# Patient Record
Sex: FEMALE | Race: WHITE | NOT HISPANIC OR LATINO | Employment: FULL TIME
[De-identification: names, ages, dates, MRNs, and addresses within clinical notes are randomized per-mention and may not be internally consistent; named-entity substitution may affect disease eponyms.]

---

## 2022-02-19 ENCOUNTER — HEALTH MAINTENANCE LETTER (OUTPATIENT)
Age: 27
End: 2022-02-19

## 2022-03-04 ENCOUNTER — EXTERNAL ORDER RESULTS (OUTPATIENT)
Dept: LAB | Facility: CLINIC | Age: 27
End: 2022-03-04
Payer: COMMERCIAL

## 2022-04-28 ENCOUNTER — TRANSFERRED RECORDS (OUTPATIENT)
Dept: HEALTH INFORMATION MANAGEMENT | Facility: CLINIC | Age: 27
End: 2022-04-28
Payer: COMMERCIAL

## 2022-05-19 ENCOUNTER — HOSPITAL ENCOUNTER (OUTPATIENT)
Age: 27
End: 2022-05-19
Payer: COMMERCIAL

## 2022-05-29 ENCOUNTER — HOSPITAL ENCOUNTER (INPATIENT)
Facility: CLINIC | Age: 27
LOS: 3 days | Discharge: HOME HEALTH | End: 2022-06-01
Attending: OBSTETRICS & GYNECOLOGY | Admitting: OBSTETRICS & GYNECOLOGY
Payer: COMMERCIAL

## 2022-05-29 LAB
ABO/RH(D): NORMAL
ANTIBODY SCREEN: NEGATIVE
HGB BLD-MCNC: 12.5 G/DL (ref 11.7–15.7)
HOLD SPECIMEN: NORMAL
RUPTURE OF FETAL MEMBRANES BY ROM PLUS: POSITIVE
SARS-COV-2 RNA RESP QL NAA+PROBE: NEGATIVE
SPECIMEN EXPIRATION DATE: NORMAL

## 2022-05-29 PROCEDURE — 59025 FETAL NON-STRESS TEST: CPT

## 2022-05-29 PROCEDURE — U0003 INFECTIOUS AGENT DETECTION BY NUCLEIC ACID (DNA OR RNA); SEVERE ACUTE RESPIRATORY SYNDROME CORONAVIRUS 2 (SARS-COV-2) (CORONAVIRUS DISEASE [COVID-19]), AMPLIFIED PROBE TECHNIQUE, MAKING USE OF HIGH THROUGHPUT TECHNOLOGIES AS DESCRIBED BY CMS-2020-01-R: HCPCS | Performed by: OBSTETRICS & GYNECOLOGY

## 2022-05-29 PROCEDURE — C9803 HOPD COVID-19 SPEC COLLECT: HCPCS

## 2022-05-29 PROCEDURE — 120N000001 HC R&B MED SURG/OB

## 2022-05-29 PROCEDURE — 86780 TREPONEMA PALLIDUM: CPT | Performed by: OBSTETRICS & GYNECOLOGY

## 2022-05-29 PROCEDURE — 36415 COLL VENOUS BLD VENIPUNCTURE: CPT | Performed by: OBSTETRICS & GYNECOLOGY

## 2022-05-29 PROCEDURE — 250N000009 HC RX 250: Performed by: OBSTETRICS & GYNECOLOGY

## 2022-05-29 PROCEDURE — 86901 BLOOD TYPING SEROLOGIC RH(D): CPT | Performed by: OBSTETRICS & GYNECOLOGY

## 2022-05-29 PROCEDURE — 250N000013 HC RX MED GY IP 250 OP 250 PS 637: Performed by: OBSTETRICS & GYNECOLOGY

## 2022-05-29 PROCEDURE — 258N000003 HC RX IP 258 OP 636: Performed by: OBSTETRICS & GYNECOLOGY

## 2022-05-29 PROCEDURE — 85018 HEMOGLOBIN: CPT | Performed by: OBSTETRICS & GYNECOLOGY

## 2022-05-29 PROCEDURE — G0463 HOSPITAL OUTPT CLINIC VISIT: HCPCS | Mod: 25

## 2022-05-29 PROCEDURE — 84112 EVAL AMNIOTIC FLUID PROTEIN: CPT | Performed by: OBSTETRICS & GYNECOLOGY

## 2022-05-29 RX ORDER — PROCHLORPERAZINE MALEATE 5 MG
10 TABLET ORAL EVERY 6 HOURS PRN
Status: DISCONTINUED | OUTPATIENT
Start: 2022-05-29 | End: 2022-05-30 | Stop reason: HOSPADM

## 2022-05-29 RX ORDER — OXYTOCIN 10 [USP'U]/ML
10 INJECTION, SOLUTION INTRAMUSCULAR; INTRAVENOUS
Status: DISCONTINUED | OUTPATIENT
Start: 2022-05-29 | End: 2022-05-30

## 2022-05-29 RX ORDER — TRANEXAMIC ACID 10 MG/ML
1 INJECTION, SOLUTION INTRAVENOUS EVERY 30 MIN PRN
Status: DISCONTINUED | OUTPATIENT
Start: 2022-05-29 | End: 2022-05-30 | Stop reason: HOSPADM

## 2022-05-29 RX ORDER — PROCHLORPERAZINE 25 MG
25 SUPPOSITORY, RECTAL RECTAL EVERY 12 HOURS PRN
Status: DISCONTINUED | OUTPATIENT
Start: 2022-05-29 | End: 2022-05-29 | Stop reason: HOSPADM

## 2022-05-29 RX ORDER — ONDANSETRON 4 MG/1
4 TABLET, ORALLY DISINTEGRATING ORAL EVERY 6 HOURS PRN
Status: DISCONTINUED | OUTPATIENT
Start: 2022-05-29 | End: 2022-05-29 | Stop reason: HOSPADM

## 2022-05-29 RX ORDER — METOCLOPRAMIDE 10 MG/1
10 TABLET ORAL EVERY 6 HOURS PRN
Status: DISCONTINUED | OUTPATIENT
Start: 2022-05-29 | End: 2022-05-30 | Stop reason: HOSPADM

## 2022-05-29 RX ORDER — NALOXONE HYDROCHLORIDE 0.4 MG/ML
0.4 INJECTION, SOLUTION INTRAMUSCULAR; INTRAVENOUS; SUBCUTANEOUS
Status: DISCONTINUED | OUTPATIENT
Start: 2022-05-29 | End: 2022-05-30 | Stop reason: HOSPADM

## 2022-05-29 RX ORDER — NALOXONE HYDROCHLORIDE 0.4 MG/ML
0.2 INJECTION, SOLUTION INTRAMUSCULAR; INTRAVENOUS; SUBCUTANEOUS
Status: DISCONTINUED | OUTPATIENT
Start: 2022-05-29 | End: 2022-05-30 | Stop reason: HOSPADM

## 2022-05-29 RX ORDER — CITRIC ACID/SODIUM CITRATE 334-500MG
30 SOLUTION, ORAL ORAL
Status: DISCONTINUED | OUTPATIENT
Start: 2022-05-29 | End: 2022-05-30 | Stop reason: HOSPADM

## 2022-05-29 RX ORDER — IBUPROFEN 400 MG/1
800 TABLET, FILM COATED ORAL
Status: DISCONTINUED | OUTPATIENT
Start: 2022-05-29 | End: 2022-05-30

## 2022-05-29 RX ORDER — ONDANSETRON 2 MG/ML
4 INJECTION INTRAMUSCULAR; INTRAVENOUS EVERY 6 HOURS PRN
Status: DISCONTINUED | OUTPATIENT
Start: 2022-05-29 | End: 2022-05-29 | Stop reason: HOSPADM

## 2022-05-29 RX ORDER — METOCLOPRAMIDE HYDROCHLORIDE 5 MG/ML
10 INJECTION INTRAMUSCULAR; INTRAVENOUS EVERY 6 HOURS PRN
Status: DISCONTINUED | OUTPATIENT
Start: 2022-05-29 | End: 2022-05-30 | Stop reason: HOSPADM

## 2022-05-29 RX ORDER — CARBOPROST TROMETHAMINE 250 UG/ML
250 INJECTION, SOLUTION INTRAMUSCULAR
Status: DISCONTINUED | OUTPATIENT
Start: 2022-05-29 | End: 2022-05-30 | Stop reason: HOSPADM

## 2022-05-29 RX ORDER — KETOROLAC TROMETHAMINE 30 MG/ML
30 INJECTION, SOLUTION INTRAMUSCULAR; INTRAVENOUS
Status: DISCONTINUED | OUTPATIENT
Start: 2022-05-29 | End: 2022-05-30

## 2022-05-29 RX ORDER — PROCHLORPERAZINE 25 MG
25 SUPPOSITORY, RECTAL RECTAL EVERY 12 HOURS PRN
Status: DISCONTINUED | OUTPATIENT
Start: 2022-05-29 | End: 2022-05-30 | Stop reason: HOSPADM

## 2022-05-29 RX ORDER — OXYTOCIN 10 [USP'U]/ML
10 INJECTION, SOLUTION INTRAMUSCULAR; INTRAVENOUS
Status: DISCONTINUED | OUTPATIENT
Start: 2022-05-29 | End: 2022-05-30 | Stop reason: HOSPADM

## 2022-05-29 RX ORDER — LIDOCAINE 40 MG/G
CREAM TOPICAL
Status: DISCONTINUED | OUTPATIENT
Start: 2022-05-29 | End: 2022-05-30 | Stop reason: HOSPADM

## 2022-05-29 RX ORDER — METOCLOPRAMIDE HYDROCHLORIDE 5 MG/ML
10 INJECTION INTRAMUSCULAR; INTRAVENOUS EVERY 6 HOURS PRN
Status: DISCONTINUED | OUTPATIENT
Start: 2022-05-29 | End: 2022-05-29 | Stop reason: HOSPADM

## 2022-05-29 RX ORDER — PROCHLORPERAZINE MALEATE 5 MG
10 TABLET ORAL EVERY 6 HOURS PRN
Status: DISCONTINUED | OUTPATIENT
Start: 2022-05-29 | End: 2022-05-29 | Stop reason: HOSPADM

## 2022-05-29 RX ORDER — MISOPROSTOL 200 UG/1
400 TABLET ORAL
Status: DISCONTINUED | OUTPATIENT
Start: 2022-05-29 | End: 2022-05-30 | Stop reason: HOSPADM

## 2022-05-29 RX ORDER — SODIUM CHLORIDE, SODIUM LACTATE, POTASSIUM CHLORIDE, CALCIUM CHLORIDE 600; 310; 30; 20 MG/100ML; MG/100ML; MG/100ML; MG/100ML
INJECTION, SOLUTION INTRAVENOUS CONTINUOUS PRN
Status: DISCONTINUED | OUTPATIENT
Start: 2022-05-29 | End: 2022-05-30 | Stop reason: HOSPADM

## 2022-05-29 RX ORDER — ONDANSETRON 4 MG/1
4 TABLET, ORALLY DISINTEGRATING ORAL EVERY 6 HOURS PRN
Status: DISCONTINUED | OUTPATIENT
Start: 2022-05-29 | End: 2022-05-30 | Stop reason: HOSPADM

## 2022-05-29 RX ORDER — OXYTOCIN/0.9 % SODIUM CHLORIDE 30/500 ML
100-340 PLASTIC BAG, INJECTION (ML) INTRAVENOUS CONTINUOUS PRN
Status: DISCONTINUED | OUTPATIENT
Start: 2022-05-29 | End: 2022-05-30

## 2022-05-29 RX ORDER — METOCLOPRAMIDE 10 MG/1
10 TABLET ORAL EVERY 6 HOURS PRN
Status: DISCONTINUED | OUTPATIENT
Start: 2022-05-29 | End: 2022-05-29 | Stop reason: HOSPADM

## 2022-05-29 RX ORDER — TERBUTALINE SULFATE 1 MG/ML
0.25 INJECTION, SOLUTION SUBCUTANEOUS
Status: DISCONTINUED | OUTPATIENT
Start: 2022-05-29 | End: 2022-05-30 | Stop reason: HOSPADM

## 2022-05-29 RX ORDER — METHYLERGONOVINE MALEATE 0.2 MG/ML
200 INJECTION INTRAVENOUS
Status: DISCONTINUED | OUTPATIENT
Start: 2022-05-29 | End: 2022-05-30 | Stop reason: HOSPADM

## 2022-05-29 RX ORDER — SODIUM CHLORIDE, SODIUM LACTATE, POTASSIUM CHLORIDE, CALCIUM CHLORIDE 600; 310; 30; 20 MG/100ML; MG/100ML; MG/100ML; MG/100ML
INJECTION, SOLUTION INTRAVENOUS CONTINUOUS
Status: DISCONTINUED | OUTPATIENT
Start: 2022-05-29 | End: 2022-05-30 | Stop reason: HOSPADM

## 2022-05-29 RX ORDER — MISOPROSTOL 100 UG/1
25 TABLET ORAL
Status: DISCONTINUED | OUTPATIENT
Start: 2022-05-29 | End: 2022-05-30 | Stop reason: HOSPADM

## 2022-05-29 RX ORDER — OXYTOCIN/0.9 % SODIUM CHLORIDE 30/500 ML
1-24 PLASTIC BAG, INJECTION (ML) INTRAVENOUS CONTINUOUS
Status: DISCONTINUED | OUTPATIENT
Start: 2022-05-29 | End: 2022-05-30 | Stop reason: HOSPADM

## 2022-05-29 RX ORDER — ONDANSETRON 2 MG/ML
4 INJECTION INTRAMUSCULAR; INTRAVENOUS EVERY 6 HOURS PRN
Status: DISCONTINUED | OUTPATIENT
Start: 2022-05-29 | End: 2022-05-30 | Stop reason: HOSPADM

## 2022-05-29 RX ORDER — MISOPROSTOL 200 UG/1
800 TABLET ORAL
Status: DISCONTINUED | OUTPATIENT
Start: 2022-05-29 | End: 2022-05-30 | Stop reason: HOSPADM

## 2022-05-29 RX ORDER — VITAMIN A ACETATE, .BETA.-CAROTENE, ASCORBIC ACID, CHOLECALCIFEROL, .ALPHA.-TOCOPHEROL ACETATE, DL-, THIAMINE MONONITRATE, RIBOFLAVIN, NIACINAMIDE, PYRIDOXINE HYDROCHLORIDE, FOLIC ACID, CYANOCOBALAMIN, CALCIUM CARBONATE, FERROUS FUMARATE, ZINC OXIDE, AND CUPRIC OXIDE 2000; 2000; 120; 400; 22; 1.84; 3; 20; 10; 1; 12; 200; 27; 25; 2 [IU]/1; [IU]/1; MG/1; [IU]/1; MG/1; MG/1; MG/1; MG/1; MG/1; MG/1; UG/1; MG/1; MG/1; MG/1; MG/1
1 TABLET ORAL DAILY
COMMUNITY

## 2022-05-29 RX ORDER — OXYTOCIN/0.9 % SODIUM CHLORIDE 30/500 ML
340 PLASTIC BAG, INJECTION (ML) INTRAVENOUS CONTINUOUS PRN
Status: DISCONTINUED | OUTPATIENT
Start: 2022-05-29 | End: 2022-05-30 | Stop reason: HOSPADM

## 2022-05-29 RX ADMIN — Medication 2 MILLI-UNITS/MIN: at 20:15

## 2022-05-29 RX ADMIN — MISOPROSTOL 25 MCG: 100 TABLET ORAL at 14:05

## 2022-05-29 RX ADMIN — SODIUM CHLORIDE, POTASSIUM CHLORIDE, SODIUM LACTATE AND CALCIUM CHLORIDE: 600; 310; 30; 20 INJECTION, SOLUTION INTRAVENOUS at 20:11

## 2022-05-29 RX ADMIN — MISOPROSTOL 25 MCG: 100 TABLET ORAL at 16:05

## 2022-05-29 RX ADMIN — MISOPROSTOL 25 MCG: 100 TABLET ORAL at 12:06

## 2022-05-29 RX ADMIN — MISOPROSTOL 25 MCG: 100 TABLET ORAL at 18:00

## 2022-05-29 ASSESSMENT — ACTIVITIES OF DAILY LIVING (ADL)
ADLS_ACUITY_SCORE: 18

## 2022-05-29 NOTE — PLAN OF CARE
Pt and  oriented to room and surroundings.  EUS/TOCO applied. VSS. IV saline lock inserted. COVID swab collected and sent to lab. PO cytotec started. Pt feels slight occasional cramping.

## 2022-05-29 NOTE — PROGRESS NOTES
"OB Progress Note  2022  6:38 PM    S:  Pt doing well. Strong menstrual cramping.     O:  /70   Temp 97.88  F (36.6  C) (Temporal)   Resp 16   Ht 1.626 m (5' 4\")   Wt 72.1 kg (159 lb)   BMI 27.29 kg/m    EFM: baseline 145 bpm, moderate variability, accelerations present, no decels  [place]:  Ctx occasional  SVE:  /-2 on admit  Membranes: SROM @ 0000    A/P: 27 year old  @ 40w2d admitted for labor management after PROM.     Labor:  - s/p SROM, clear fluid  - s/p 4 doses of cytotec  - Plan to check when next due and likely start IV pitocin  - Pain: per patient preference     FWB: Category I, reactive  - continuous fetal monitoring     Prenatal: GBS negative  - s/p Tdap, influenza, COVID vaccines  - COVID screen negative     Teresa Garza MD  6:41 PM    "

## 2022-05-29 NOTE — PLAN OF CARE
Pt arrives stating she's been leaking clear fluid since midnight. She denies vaginal bleeding or ctx and states her baby has been active. Verbal consent received to place external monitors, perform sve and rom plus. Admission intake done. Prenatal reviewed. Pt's  supportive at bedside.

## 2022-05-29 NOTE — H&P
"OB Brief Admit H&P    No significant change in general health status based on examination of the patient, review of Nursing Admission Database and prenatal record.    Pt is a 27 year old  @ 40w2d who presented to L&D with leaking of fluid. Noted small leaking at midnight.    Patient's prenatal course has been complicated by failed 1 hr GCT, passed 3hr GTT, marginal cord insertion with normal growth, possible bilobed placenta.     Prenatal Labs:    Blood type A pos  Rubella immune  GCT failed, passed 3 hr GTT  GBS negative    EFW: 7.5 lbs    /67   Temp 99.1  F (37.3  C) (Temporal)   Resp 16   Ht 1.626 m (5' 4\")   Wt 72.1 kg (159 lb)   BMI 27.29 kg/m    EFM:  Baseline 145 bpm, moderate variability, accels present, no decels  [place]: rare contractions  SVE: 50/-2 per RN  Membranes:  SROM 0000     Assessment:  27 year old  @ 40w2d admitted for labor management after PROM.    Labor:  - s/p SROM, clear fluid  - Will starting with a few doses of PO cytotec for cervical ripening  - Pit once more favorable    FWB: Category I, reactive  - continuous fetal monitoring    Prenatal: GBS negative  - s/p Tdap, influenza, COVID vaccines  - COVID screen negative    Teresa Garza MD  2022  12:58 PM      "

## 2022-05-29 NOTE — PROVIDER NOTIFICATION
Dr Garza notified via phone of pt's positive rom plus result, sve, fht. New orders received for admission. Pt moved to labor room. Report given to NIK Silverman RN.

## 2022-05-30 ENCOUNTER — ANESTHESIA EVENT (OUTPATIENT)
Dept: OBGYN | Facility: CLINIC | Age: 27
End: 2022-05-30
Payer: COMMERCIAL

## 2022-05-30 ENCOUNTER — ANESTHESIA (OUTPATIENT)
Dept: OBGYN | Facility: CLINIC | Age: 27
End: 2022-05-30
Payer: COMMERCIAL

## 2022-05-30 PROCEDURE — 0HQ9XZZ REPAIR PERINEUM SKIN, EXTERNAL APPROACH: ICD-10-PCS | Performed by: MIDWIFE

## 2022-05-30 PROCEDURE — 258N000003 HC RX IP 258 OP 636: Performed by: OBSTETRICS & GYNECOLOGY

## 2022-05-30 PROCEDURE — 250N000011 HC RX IP 250 OP 636

## 2022-05-30 PROCEDURE — 370N000003 HC ANESTHESIA WARD SERVICE

## 2022-05-30 PROCEDURE — 120N000012 HC R&B POSTPARTUM

## 2022-05-30 PROCEDURE — 250N000009 HC RX 250: Performed by: OBSTETRICS & GYNECOLOGY

## 2022-05-30 PROCEDURE — 250N000011 HC RX IP 250 OP 636: Performed by: ANESTHESIOLOGY

## 2022-05-30 PROCEDURE — 3E0R3BZ INTRODUCTION OF ANESTHETIC AGENT INTO SPINAL CANAL, PERCUTANEOUS APPROACH: ICD-10-PCS | Performed by: ANESTHESIOLOGY

## 2022-05-30 PROCEDURE — 250N000013 HC RX MED GY IP 250 OP 250 PS 637: Performed by: MIDWIFE

## 2022-05-30 PROCEDURE — 10D17ZZ EXTRACTION OF PRODUCTS OF CONCEPTION, RETAINED, VIA NATURAL OR ARTIFICIAL OPENING: ICD-10-PCS | Performed by: MIDWIFE

## 2022-05-30 PROCEDURE — 00HU33Z INSERTION OF INFUSION DEVICE INTO SPINAL CANAL, PERCUTANEOUS APPROACH: ICD-10-PCS | Performed by: ANESTHESIOLOGY

## 2022-05-30 PROCEDURE — 722N000001 HC LABOR CARE VAGINAL DELIVERY SINGLE

## 2022-05-30 RX ORDER — CARBOPROST TROMETHAMINE 250 UG/ML
250 INJECTION, SOLUTION INTRAMUSCULAR
Status: DISCONTINUED | OUTPATIENT
Start: 2022-05-30 | End: 2022-06-01 | Stop reason: HOSPADM

## 2022-05-30 RX ORDER — DOCUSATE SODIUM 100 MG/1
100 CAPSULE, LIQUID FILLED ORAL DAILY
Status: DISCONTINUED | OUTPATIENT
Start: 2022-05-30 | End: 2022-06-01 | Stop reason: HOSPADM

## 2022-05-30 RX ORDER — NALOXONE HYDROCHLORIDE 0.4 MG/ML
0.4 INJECTION, SOLUTION INTRAMUSCULAR; INTRAVENOUS; SUBCUTANEOUS
Status: DISCONTINUED | OUTPATIENT
Start: 2022-05-30 | End: 2022-06-01 | Stop reason: HOSPADM

## 2022-05-30 RX ORDER — MISOPROSTOL 200 UG/1
800 TABLET ORAL
Status: DISCONTINUED | OUTPATIENT
Start: 2022-05-30 | End: 2022-06-01 | Stop reason: HOSPADM

## 2022-05-30 RX ORDER — OXYTOCIN 10 [USP'U]/ML
10 INJECTION, SOLUTION INTRAMUSCULAR; INTRAVENOUS
Status: DISCONTINUED | OUTPATIENT
Start: 2022-05-30 | End: 2022-06-01 | Stop reason: HOSPADM

## 2022-05-30 RX ORDER — FENTANYL CITRATE-0.9 % NACL/PF 10 MCG/ML
100 PLASTIC BAG, INJECTION (ML) INTRAVENOUS EVERY 5 MIN PRN
Status: DISCONTINUED | OUTPATIENT
Start: 2022-05-30 | End: 2022-05-30 | Stop reason: HOSPADM

## 2022-05-30 RX ORDER — ROPIVACAINE HYDROCHLORIDE 2 MG/ML
10 INJECTION, SOLUTION EPIDURAL; INFILTRATION; PERINEURAL ONCE
Status: DISCONTINUED | OUTPATIENT
Start: 2022-05-30 | End: 2022-05-30 | Stop reason: HOSPADM

## 2022-05-30 RX ORDER — LIDOCAINE HYDROCHLORIDE 10 MG/ML
INJECTION, SOLUTION EPIDURAL; INFILTRATION; INTRACAUDAL; PERINEURAL
Status: DISCONTINUED
Start: 2022-05-30 | End: 2022-05-30 | Stop reason: WASHOUT

## 2022-05-30 RX ORDER — IBUPROFEN 400 MG/1
800 TABLET, FILM COATED ORAL EVERY 6 HOURS PRN
Status: DISCONTINUED | OUTPATIENT
Start: 2022-05-30 | End: 2022-06-01 | Stop reason: HOSPADM

## 2022-05-30 RX ORDER — NALBUPHINE HYDROCHLORIDE 20 MG/ML
2.5-5 INJECTION, SOLUTION INTRAMUSCULAR; INTRAVENOUS; SUBCUTANEOUS EVERY 6 HOURS PRN
Status: DISCONTINUED | OUTPATIENT
Start: 2022-05-30 | End: 2022-05-30

## 2022-05-30 RX ORDER — ROPIVACAINE HYDROCHLORIDE 2 MG/ML
INJECTION, SOLUTION EPIDURAL; INFILTRATION; PERINEURAL
Status: COMPLETED | OUTPATIENT
Start: 2022-05-30 | End: 2022-05-30

## 2022-05-30 RX ORDER — CEFAZOLIN SODIUM 1 G/3ML
1 INJECTION, POWDER, FOR SOLUTION INTRAMUSCULAR; INTRAVENOUS ONCE
Status: DISCONTINUED | OUTPATIENT
Start: 2022-05-30 | End: 2022-05-30

## 2022-05-30 RX ORDER — BISACODYL 10 MG
10 SUPPOSITORY, RECTAL RECTAL DAILY PRN
Status: DISCONTINUED | OUTPATIENT
Start: 2022-05-30 | End: 2022-06-01 | Stop reason: HOSPADM

## 2022-05-30 RX ORDER — OXYCODONE HYDROCHLORIDE 5 MG/1
5 TABLET ORAL EVERY 4 HOURS PRN
Status: DISCONTINUED | OUTPATIENT
Start: 2022-05-30 | End: 2022-06-01 | Stop reason: HOSPADM

## 2022-05-30 RX ORDER — MISOPROSTOL 200 UG/1
400 TABLET ORAL
Status: DISCONTINUED | OUTPATIENT
Start: 2022-05-30 | End: 2022-06-01 | Stop reason: HOSPADM

## 2022-05-30 RX ORDER — NALOXONE HYDROCHLORIDE 0.4 MG/ML
0.2 INJECTION, SOLUTION INTRAMUSCULAR; INTRAVENOUS; SUBCUTANEOUS
Status: DISCONTINUED | OUTPATIENT
Start: 2022-05-30 | End: 2022-06-01 | Stop reason: HOSPADM

## 2022-05-30 RX ORDER — ACETAMINOPHEN 325 MG/1
650 TABLET ORAL EVERY 4 HOURS PRN
Status: DISCONTINUED | OUTPATIENT
Start: 2022-05-30 | End: 2022-06-01 | Stop reason: HOSPADM

## 2022-05-30 RX ORDER — CEFAZOLIN SODIUM 1 G/3ML
1 INJECTION, POWDER, FOR SOLUTION INTRAMUSCULAR; INTRAVENOUS EVERY 8 HOURS
Status: DISCONTINUED | OUTPATIENT
Start: 2022-05-30 | End: 2022-05-30 | Stop reason: CLARIF

## 2022-05-30 RX ORDER — TRANEXAMIC ACID 10 MG/ML
1 INJECTION, SOLUTION INTRAVENOUS EVERY 30 MIN PRN
Status: DISCONTINUED | OUTPATIENT
Start: 2022-05-30 | End: 2022-06-01 | Stop reason: HOSPADM

## 2022-05-30 RX ORDER — HYDROCORTISONE 25 MG/G
CREAM TOPICAL 3 TIMES DAILY PRN
Status: DISCONTINUED | OUTPATIENT
Start: 2022-05-30 | End: 2022-06-01 | Stop reason: HOSPADM

## 2022-05-30 RX ORDER — METHYLERGONOVINE MALEATE 0.2 MG/ML
200 INJECTION INTRAVENOUS
Status: DISCONTINUED | OUTPATIENT
Start: 2022-05-30 | End: 2022-06-01 | Stop reason: HOSPADM

## 2022-05-30 RX ORDER — OXYTOCIN/0.9 % SODIUM CHLORIDE 30/500 ML
340 PLASTIC BAG, INJECTION (ML) INTRAVENOUS CONTINUOUS PRN
Status: DISCONTINUED | OUTPATIENT
Start: 2022-05-30 | End: 2022-06-01 | Stop reason: HOSPADM

## 2022-05-30 RX ORDER — CEFAZOLIN SODIUM 1 G/3ML
INJECTION, POWDER, FOR SOLUTION INTRAMUSCULAR; INTRAVENOUS
Status: COMPLETED
Start: 2022-05-30 | End: 2022-05-30

## 2022-05-30 RX ORDER — MODIFIED LANOLIN
OINTMENT (GRAM) TOPICAL
Status: DISCONTINUED | OUTPATIENT
Start: 2022-05-30 | End: 2022-06-01 | Stop reason: HOSPADM

## 2022-05-30 RX ADMIN — CEFAZOLIN 1 G: 1 INJECTION, POWDER, FOR SOLUTION INTRAMUSCULAR; INTRAVENOUS at 11:30

## 2022-05-30 RX ADMIN — IBUPROFEN 800 MG: 400 TABLET, FILM COATED ORAL at 17:34

## 2022-05-30 RX ADMIN — ROPIVACAINE HYDROCHLORIDE 10 ML: 2 INJECTION, SOLUTION EPIDURAL; INFILTRATION at 02:56

## 2022-05-30 RX ADMIN — Medication 12 ML/HR: at 02:50

## 2022-05-30 RX ADMIN — SODIUM CHLORIDE, POTASSIUM CHLORIDE, SODIUM LACTATE AND CALCIUM CHLORIDE: 600; 310; 30; 20 INJECTION, SOLUTION INTRAVENOUS at 07:25

## 2022-05-30 RX ADMIN — SODIUM CHLORIDE, POTASSIUM CHLORIDE, SODIUM LACTATE AND CALCIUM CHLORIDE: 600; 310; 30; 20 INJECTION, SOLUTION INTRAVENOUS at 02:31

## 2022-05-30 RX ADMIN — IBUPROFEN 800 MG: 400 TABLET, FILM COATED ORAL at 11:30

## 2022-05-30 RX ADMIN — ACETAMINOPHEN 650 MG: 325 TABLET ORAL at 11:40

## 2022-05-30 RX ADMIN — CEFAZOLIN SODIUM 1 G: 1 INJECTION, POWDER, FOR SOLUTION INTRAMUSCULAR; INTRAVENOUS at 11:30

## 2022-05-30 RX ADMIN — SODIUM CHLORIDE, POTASSIUM CHLORIDE, SODIUM LACTATE AND CALCIUM CHLORIDE 1000 ML: 600; 310; 30; 20 INJECTION, SOLUTION INTRAVENOUS at 02:16

## 2022-05-30 RX ADMIN — ACETAMINOPHEN 650 MG: 325 TABLET ORAL at 17:34

## 2022-05-30 ASSESSMENT — ACTIVITIES OF DAILY LIVING (ADL)
ADLS_ACUITY_SCORE: 18
ADLS_ACUITY_SCORE: 21
ADLS_ACUITY_SCORE: 18

## 2022-05-30 NOTE — PLAN OF CARE
Vital signs stable. Pt st cathed for 800cc at 1530, now voiding without difficulty. Able to ambulate in room free of dizziness. Taking tylenol/ibuprofen for pain management. Working on breastfeeding infant every 2-3 hours. Encouraged to call with questions/concerns. Will continue to monitor.

## 2022-05-30 NOTE — PLAN OF CARE
Pitocin at 10. Pt resting in bed. Tolerating contractions well, feels like they are getting stronger. Planning on an epidural once more uncomfortable. Report given to Joyce HERNANDEZ RN to assume care.

## 2022-05-30 NOTE — PLAN OF CARE
Spontaneous vaginal delivery of vigorous baby boy with apgars 9 & 9 following 3+ hours of pushing.  See delivery summary and flow sheet.

## 2022-05-30 NOTE — L&D DELIVERY NOTE
OB Delivery Summary    1.  with  IUP @ 40w3d  2.  Pregnancy complications- Marginal cord insertion, accessory placental lobe, normal growth scans.  Elevated 1 hour GTT, normal 3 hr GTT.  3.  Labor- PROM, received 4 doses of cytotec, then Pitocin augmentation.  4   Analgesia- Epidural-  5.  Fetal heart tones-170 baseline, accelerations present, mod variability,occasional  variable decelerations with pushing, resolved after uterine contraction  6.  Labor interventions none  7.  Membranes-SROM @ 0000 on 2022  8.   Infant- viable, vigorous male, vertex, MICKI delivered at 0946 on 2022, Apgars 9 and 9 at one and five minutes.  Weight 6 pounds 8oz   9.   Placenta- manually removed by Dr. Garza, @ 1040 2022, intact see note.   1 gm of ancef ordered IV prophylactically.   10.  Lacerations- first  degree vaginal laceration,epidural used for analgesa, 3-0 vicryl suture used to repair in the usual fashion.  Bilateral labial lacerations, right side repaired, left side unrepaired, hemostatic  11.  Complications- retained placenta, manually removed without difficulty by Dr Garza  12.  EBL- 294cc  13.  Labor course:  1st stage 4 hours 30min                                  2nd stage 4hours 31min                                   3rd stage 54min    Kateryna Saldaña CNM  2022  10:59 AM

## 2022-05-30 NOTE — PROVIDER NOTIFICATION
05/30/22 0539   Provider Notification   Provider Name/Title Dr. Garza   Method of Notification Electronic Page   Request Evaluate - Remote;Evaluate in Person   Notification Reason SVE;Labor Status     Pt is complete, +1-+2 station

## 2022-05-30 NOTE — PLAN OF CARE
Has received 4 doses of PO cytotec. SVE 1/70/-2. Dr Garza updated with SVE. Orders to start pitocin. Pt feels like crampiness is more frequent but not necessarily stronger. Leaking small amounts of clear fluid.

## 2022-05-30 NOTE — PLAN OF CARE
Problem: Urinary Retention (Postpartum Vaginal Delivery)  Goal: Effective Urinary Elimination  Outcome: Ongoing, Not Progressing    Assisted to BR, pt unable to void. FFU, SR. Perineum swollen, ice and tucks applied. Encouraged fluids, will attempt to void again, if unable to void will bladder scan and straight cath per protocol.    Assisted with breastfeeding. Infant able to latch in football hold with some assist. Mother able to hand express numerous drops of colostrum.

## 2022-05-30 NOTE — ANESTHESIA PROCEDURE NOTES
Epidural catheter Procedure Note    Pre-Procedure   Staff -        Anesthesiologist:  Inocencio Valdovinos MD       Performed By: Anesthesiologist       Location: OB       Pre-Anesthestic Checklist: patient identified, risks and benefits discussed, informed consent, monitors and equipment checked, pre-op evaluation and at physician/surgeon's request  Timeout:       Correct Patient: Yes        Correct Procedure: Yes        Correct Site: Yes        Correct Position: Yes   Procedure Documentation  Procedure: epidural catheter       Patient Position: sitting       Skin prep: Chloraprep       Local skin infiltrated with mL of 1% lidocaine.        Insertion Site: L3-4. (midline approach).       Technique: LORT saline        AISHA at 4 cm.       Needle Type: Touhy needle       Needle Gauge: 17.        Needle Length (Inches): 5        Catheter: 18 G.          Catheter threaded easily.           Threaded 8.5 cm at skin.         # of attempts: 1 and  # of redirects:  0    Assessment/Narrative         Paresthesias: No.       Test dose of 3 mL at.         Test dose negative, 3 minutes after injection, for signs of intravascular, subdural, or intrathecal injection.       Insertion/Infusion Method: LORT saline       Aspiration negative for Heme or CSF via Epidural Catheter.    Medication(s) Administered   0.2% Ropivacaine (Epidural) - EPIDURAL   10 mL - 5/30/2022 2:56:00 AM   Comments:  Risks, benefits, alternatives of epidural analgesia discussed with the patient who agrees to proceed.  After a procedural timeout confirming the correct patient and details of the procedure, 1% lidocaine was injected superficially over the skin for topical anesthesia.  A 17 G Tuohy needle was advanced at the above lumbar interspace until contact was felt with ligamentum flavum.  Loss of resistance to saline was encountered at above noted depth.  3 ml of saline was injected to expand the epidural space.  The epidural catheter was then easily threaded  to the depth noted above.  Paresthesias were as noted above.Ropivacaine 10 ml bolus via epidural catheter administered at end of procedure.  Patient tolerated well.

## 2022-05-30 NOTE — PLAN OF CARE
Pt unable to void, uterus deviated to right. Bladder scanned for 481. Straight cathed for 800 ml. Uterus midline, down 1 now.

## 2022-05-31 LAB
HGB BLD-MCNC: 9.2 G/DL (ref 11.7–15.7)
T PALLIDUM AB SER QL: NONREACTIVE

## 2022-05-31 PROCEDURE — 85018 HEMOGLOBIN: CPT | Performed by: MIDWIFE

## 2022-05-31 PROCEDURE — 36415 COLL VENOUS BLD VENIPUNCTURE: CPT | Performed by: MIDWIFE

## 2022-05-31 PROCEDURE — 120N000012 HC R&B POSTPARTUM

## 2022-05-31 PROCEDURE — 250N000013 HC RX MED GY IP 250 OP 250 PS 637: Performed by: MIDWIFE

## 2022-05-31 RX ADMIN — ACETAMINOPHEN 650 MG: 325 TABLET ORAL at 22:11

## 2022-05-31 RX ADMIN — IBUPROFEN 800 MG: 400 TABLET, FILM COATED ORAL at 22:11

## 2022-05-31 RX ADMIN — DOCUSATE SODIUM 100 MG: 100 CAPSULE, LIQUID FILLED ORAL at 09:01

## 2022-05-31 RX ADMIN — ACETAMINOPHEN 650 MG: 325 TABLET ORAL at 09:55

## 2022-05-31 RX ADMIN — IBUPROFEN 800 MG: 400 TABLET, FILM COATED ORAL at 09:55

## 2022-05-31 RX ADMIN — ACETAMINOPHEN 650 MG: 325 TABLET ORAL at 15:46

## 2022-05-31 RX ADMIN — IBUPROFEN 800 MG: 400 TABLET, FILM COATED ORAL at 15:46

## 2022-05-31 RX ADMIN — ACETAMINOPHEN 650 MG: 325 TABLET ORAL at 03:39

## 2022-05-31 RX ADMIN — IBUPROFEN 800 MG: 400 TABLET, FILM COATED ORAL at 03:39

## 2022-05-31 ASSESSMENT — ACTIVITIES OF DAILY LIVING (ADL)
ADLS_ACUITY_SCORE: 18

## 2022-05-31 NOTE — PROGRESS NOTES
[facility]    Post-Partum Progress Note    Assessment & Plan   Assessment:  Post-partum day #1  Normal spontaneous vaginal delivery    Doing well.  Normal healing wound.  No excessive bleeding  Pain well-controlled.    Plan:  Ambulation encouraged  Breast feeding strategies discussed  Hemoglobin in AM  Anticipate discharge tomorrow    CEE Zelaya CNM, MD     Interval History   Doing well.  Pain is well-controlled.  No fevers.  No history of foul-smelling vaginal discharge.  Good appetite.  Denies chest pain, shortness of breath, nausea or vomiting.  Vaginal bleeding is similar to a heavy menstrual flow.  Ambulatory.  Breastfeeding well.    Medications     - MEDICATION INSTRUCTIONS -       - MEDICATION INSTRUCTIONS -       oxytocin in 0.9% NaCl         docusate sodium  100 mg Oral Daily       Physical Exam   Temp: 97.4  F (36.3  C) Temp src: Axillary BP: 125/81 Pulse: 97   Resp: 16 SpO2: 100 % O2 Device: None (Room air)    Vitals:    05/29/22 1040   Weight: 72.1 kg (159 lb)     Vital Signs with Ranges  Temp:  [97.3  F (36.3  C)-99.86  F (37.7  C)] 97.4  F (36.3  C)  Pulse:  [97] 97  Resp:  [16-18] 16  BP: (111-139)/(54-82) 125/81  SpO2:  [100 %] 100 %  I/O last 3 completed shifts:  In: -   Out: 1809 [Urine:1400; Blood:409]    Uterine fundus is firm, non-tender and at the level of the umbilicus  Extremities Non-tender  Perineal sutures intact and wound healing well  Heart is regular rate and rhythm and lungs clear to auscultation    Data   Recent Labs   Lab Test 05/29/22 2128   AS Negative     Recent Labs   Lab Test 05/29/22 2128   HGB 12.5     No lab results found.

## 2022-05-31 NOTE — PLAN OF CARE
Vitals stable. Feels well.Oral pain medications controlling pain. Using Ice and tucks on perineum and sitting on  inflatable ring  Breast feeding without difficulty. Will continue to monitor.

## 2022-05-31 NOTE — ANESTHESIA PREPROCEDURE EVALUATION
Anesthesia Pre-Procedure Evaluation    Patient: Jemima Saldana   MRN: [id number] : 1995        Procedure : * No procedures listed *          History reviewed. No pertinent past medical history.   History reviewed. No pertinent surgical history.   No Known Allergies   Social History     Tobacco Use     Smoking status: Never Smoker     Smokeless tobacco: Never Used   Substance Use Topics     Alcohol use: Not Currently      Wt Readings from Last 1 Encounters:   22 72.1 kg (159 lb)        Anesthesia Evaluation            ROS/MED HX  ENT/Pulmonary:    (-) asthma   Neurologic:  - neg neurologic ROS     Cardiovascular:    (-) PIH   METS/Exercise Tolerance:     Hematologic:       Musculoskeletal:       GI/Hepatic:     (+) GERD,     Renal/Genitourinary:       Endo:       Psychiatric/Substance Use:       Infectious Disease:       Malignancy:       Other:            Physical Exam    Airway        Mallampati: II   TM distance: > 3 FB   Neck ROM: full     Respiratory Devices and Support         Dental  no notable dental history         Cardiovascular   cardiovascular exam normal          Pulmonary   pulmonary exam normal                OUTSIDE LABS:  CBC:   Lab Results   Component Value Date    HGB 9.2 (L) 2022    HGB 12.5 2022     BMP: No results found for: NA, POTASSIUM, CHLORIDE, CO2, BUN, CR, GLC  COAGS: No results found for: PTT, INR, FIBR  POC: No results found for: BGM, HCG, HCGS  HEPATIC: No results found for: ALBUMIN, PROTTOTAL, ALT, AST, GGT, ALKPHOS, BILITOTAL, BILIDIRECT, CLARA  OTHER: No results found for: PH, LACT, A1C, ANAND, PHOS, MAG, LIPASE, AMYLASE, TSH, T4, T3, CRP, SED    Anesthesia Plan    ASA Status:  2      Anesthesia Type: Epidural.              Consents    Anesthesia Plan(s) and associated risks, benefits, and realistic alternatives discussed. Questions answered and patient/representative(s) expressed understanding.    - Discussed:     - Discussed with:  Patient          Postoperative Care            Comments:    Other Comments: Orders to manage the epidural infusion have been entered, and through coordination with the nurse, we will continute to manage and monitor the patient's labor epidural.  We will continuously be available to adjust as needed thruout the entire L&D process.             Inocencio Valdovinos MD

## 2022-05-31 NOTE — LACTATION NOTE
Initial visit Jemima, FOB and baby .  Baby re latched at 1635 onto the right breast and baby able to preform nutritive suckling pattern.    Breastfeeding general information reviewed.   Advised to breastfeed exclusively, on demand, avoid pacifiers, bottles and formula unless medically indicated.  Encouraged rooming in, skin to skin, feeding on demand 8-12x/day or sooner if baby cues.  Explained benefits of holding and skin to skin.  Encouraged lots of skin to skin. Instructed on hand expression. Parents request information on pacifier use for  infant: parents informed about impact of pacifier on breastfeeding success (latch problems, nipple confusion, lower milk supply) and AAP best practice recommendation not to use pacifier for  baby before one month of age.  Parents instructed on other soothing techniques for fussy baby. Outpatient resources reviewed.    Continues to nurse well per mom. No further questions at this time.   Will follow as needed.   Cydney SHUKLAN, RN, PHN, RNC-MNN, IBCLC

## 2022-05-31 NOTE — PLAN OF CARE
Pt's VSS, up ambulating independently, voiding adequately. SL removed, fundus firm, scant lochia. Pain managed with ibuprofen and tylenol. Donut utilized for perineal discomfort when sitting. Ice and tucks utilized as well. Pt breastfeeding infant well with some assistance. Spouse at bedside supportive to pt and infant. Encouraged to call with latch assistance/questions overnight.

## 2022-05-31 NOTE — PLAN OF CARE
Vital signs stable, assessment WNL. Pain controlled with Tylenol and ibuprofen; states satisfaction with pain management. Up in room independently with no complaints of dizziness, voiding without difficulty.  at bedside and supportive. Breastfeeding baby independently every 2-3 hours. Encouraged to call RN with needs, will continue to monitor.

## 2022-05-31 NOTE — ANESTHESIA POSTPROCEDURE EVALUATION
Patient: Jemima Saldana    Procedure: * No procedures listed *       Anesthesia Type:  No value filed.    Note:     Postop Pain Control: Uneventful            Sign Out: Well controlled pain   PONV: No   Neuro/Psych: Uneventful            Sign Out: Acceptable/Baseline neuro status   Airway/Respiratory: Uneventful            Sign Out: Acceptable/Baseline resp. status   CV/Hemodynamics: Uneventful            Sign Out: Acceptable CV status; No obvious hypovolemia; No obvious fluid overload   Other NRE: NONE   DID A NON-ROUTINE EVENT OCCUR? No           Last vitals:  Vitals:    05/30/22 2122 05/31/22 0955 05/31/22 1544   BP: 125/81 122/84 106/59   Pulse:  90 87   Resp: 16 16    Temp: 36.3  C (97.4  F) 36.4  C (97.6  F) 37.1  C (98.7  F)   SpO2:          Electronically Signed By: Rudy Finney MD  May 31, 2022  4:17 PM

## 2022-06-01 VITALS
DIASTOLIC BLOOD PRESSURE: 69 MMHG | HEART RATE: 85 BPM | RESPIRATION RATE: 18 BRPM | BODY MASS INDEX: 27.14 KG/M2 | HEIGHT: 64 IN | SYSTOLIC BLOOD PRESSURE: 111 MMHG | WEIGHT: 159 LBS | TEMPERATURE: 97.6 F | OXYGEN SATURATION: 100 %

## 2022-06-01 PROCEDURE — 250N000013 HC RX MED GY IP 250 OP 250 PS 637: Performed by: MIDWIFE

## 2022-06-01 RX ORDER — ACETAMINOPHEN 325 MG/1
650 TABLET ORAL EVERY 4 HOURS PRN
Status: ON HOLD | COMMUNITY
Start: 2022-06-01 | End: 2024-09-03

## 2022-06-01 RX ORDER — DOCUSATE SODIUM 100 MG/1
100 CAPSULE, LIQUID FILLED ORAL DAILY
Status: ON HOLD | COMMUNITY
Start: 2022-06-02 | End: 2024-09-03

## 2022-06-01 RX ORDER — IBUPROFEN 800 MG/1
800 TABLET, FILM COATED ORAL EVERY 6 HOURS PRN
Status: ON HOLD | COMMUNITY
Start: 2022-06-01 | End: 2024-09-03

## 2022-06-01 RX ADMIN — IBUPROFEN 800 MG: 400 TABLET, FILM COATED ORAL at 09:51

## 2022-06-01 RX ADMIN — ACETAMINOPHEN 650 MG: 325 TABLET ORAL at 03:54

## 2022-06-01 RX ADMIN — ACETAMINOPHEN 650 MG: 325 TABLET ORAL at 09:51

## 2022-06-01 RX ADMIN — DOCUSATE SODIUM 100 MG: 100 CAPSULE, LIQUID FILLED ORAL at 07:40

## 2022-06-01 RX ADMIN — IBUPROFEN 800 MG: 400 TABLET, FILM COATED ORAL at 03:54

## 2022-06-01 ASSESSMENT — ACTIVITIES OF DAILY LIVING (ADL)
ADLS_ACUITY_SCORE: 18

## 2022-06-01 NOTE — PLAN OF CARE
Discharge teaching done with patient and spouse. Questions answered. Denies other concerns. Hugs and kisses wristband taken off patient.

## 2022-06-01 NOTE — PROGRESS NOTES
"OB Post-partum Note  PPD# 2    S:  Patient doing well.  Pain controlled.  Voiding.  Bleeding like a light period.  Breast feeding.    O:  /69 (BP Location: Left arm, Patient Position: Semi-[place]'s)   Pulse 85   Temp 97.6  F (36.4  C) (Oral)   Resp 18   Ht 1.626 m (5' 4\")   Wt 72.1 kg (159 lb)   SpO2 100%   Breastfeeding Unknown   BMI 27.29 kg/m    Gen- A&O, NAD  Abd- Non-tender, fundus firm at 1 below umbilicus  Ext- non-tender, no edema    Hemoglobin   Date Value Ref Range Status   2022 9.2 (L) 11.7 - 15.7 g/dL Final     A POS  Rubella Immune    A/P: 27 year old  PPD# 2 s/p . mnnual removal of placenta    1.  Routine post-partum cares.  2.  Anticipate d/c home this afternoon.   3.  Return to clinic at 6 weeks postpartum for routine check, sooner as needed.     CEE Anthony CNM  2022  8:32 AM  "

## 2022-06-01 NOTE — PLAN OF CARE
VSS.  Pain well controlled with tylenol and ibuprofen, requesting prn pain medications as needed.  Up independently in room. Voiding adequately. Working on breastfeeding  and  cares. Continue with plan of care and notify MD as needed.

## 2022-06-01 NOTE — PLAN OF CARE
"  Problem: Plan of Care - These are the overarching goals to be used throughout the patient stay.    Goal: Plan of Care Review/Shift Note  Description: The Plan of Care Review/Shift note should be completed every shift.  The Outcome Evaluation is a brief statement about your assessment that the patient is improving, declining, or no change.  This information will be displayed automatically on your shift note.  Outcome: Met  Goal: Patient-Specific Goal (Individualized)  Description: You can add care plan individualizations to a care plan. Examples of Individualization might be:  \"Parent requests to be called daily at 9am for status\", \"I have a hard time hearing out of my right ear\", or \"Do not touch me to wake me up as it startles me\".  Outcome: Met  Goal: Absence of Hospital-Acquired Illness or Injury  Outcome: Met  Intervention: Prevent Skin Injury  Recent Flowsheet Documentation  Taken 6/1/2022 0742 by Guillermo Watson RN  Body Position: position changed independently  Intervention: Prevent and Manage VTE (Venous Thromboembolism) Risk  Recent Flowsheet Documentation  Taken 6/1/2022 0742 by Guillermo Watson RN  Activity Management: up ad krysten  Goal: Optimal Comfort and Wellbeing  Outcome: Met  Intervention: Provide Person-Centered Care  Recent Flowsheet Documentation  Taken 6/1/2022 0742 by Guillermo Watson RN  Trust Relationship/Rapport:   choices provided   questions answered  Goal: Readiness for Transition of Care  Outcome: Met     Problem: Bleeding (Labor)  Goal: Hemostasis  Outcome: Met     Problem: Change in Fetal Wellbeing (Labor)  Goal: Stable Fetal Wellbeing  Outcome: Met  Intervention: Promote and Monitor Fetal Wellbeing  Recent Flowsheet Documentation  Taken 6/1/2022 0742 by Guillermo Watson RN  Body Position: position changed independently     Problem: Delayed Labor Progression (Labor)  Goal: Effective Progression to Delivery  Outcome: Met     Problem: Infection (Labor)  Goal: Absence of Infection " Signs and Symptoms  Outcome: Met     Problem: Labor Pain (Labor)  Goal: Acceptable Pain Control  Outcome: Met     Problem: Uterine Tachysystole (Labor)  Goal: Normal Uterine Contraction Pattern  Outcome: Met     Problem: Breastfeeding  Goal: Effective Breastfeeding  Outcome: Met  Intervention: Promote Effective Breastfeeding  Recent Flowsheet Documentation  Taken 6/1/2022 0742 by Guillermo Watson RN  Parent/Child Attachment Promotion: caring behavior modeled  Intervention: Support Exclusive Breastfeeding Success  Recent Flowsheet Documentation  Taken 6/1/2022 0742 by Guillermo Watson RN  Supportive Measures: counseling provided     Problem: Adjustment to Role Transition (Postpartum Vaginal Delivery)  Goal: Successful Maternal Role Transition  Outcome: Met  Intervention: Support Maternal Role Transition  Recent Flowsheet Documentation  Taken 6/1/2022 0742 by Guillermo Watson RN  Supportive Measures: counseling provided  Parent/Child Attachment Promotion: caring behavior modeled     Problem: Bleeding (Postpartum Vaginal Delivery)  Goal: Hemostasis  Outcome: Met     Problem: Infection (Postpartum Vaginal Delivery)  Goal: Absence of Infection Signs/Symptoms  Outcome: Met     Problem: Pain (Postpartum Vaginal Delivery)  Goal: Acceptable Pain Control  Outcome: Met     Problem: Urinary Retention (Postpartum Vaginal Delivery)  Goal: Effective Urinary Elimination  Outcome: Met       Patient had vaginal delivery of baby boy on 5/30/22. Ready for discharge.

## 2022-07-19 ENCOUNTER — LAB REQUISITION (OUTPATIENT)
Dept: LAB | Facility: CLINIC | Age: 27
End: 2022-07-19

## 2022-07-19 DIAGNOSIS — Z01.419 ENCOUNTER FOR GYNECOLOGICAL EXAMINATION (GENERAL) (ROUTINE) WITHOUT ABNORMAL FINDINGS: ICD-10-CM

## 2022-07-19 PROCEDURE — G0145 SCR C/V CYTO,THINLAYER,RESCR: HCPCS | Performed by: MIDWIFE

## 2022-07-21 LAB
BKR LAB AP GYN ADEQUACY: NORMAL
BKR LAB AP GYN INTERPRETATION: NORMAL
BKR LAB AP HPV REFLEX: NORMAL
BKR LAB AP LMP: NORMAL
BKR LAB AP PREVIOUS ABNORMAL: NORMAL
PATH REPORT.COMMENTS IMP SPEC: NORMAL
PATH REPORT.COMMENTS IMP SPEC: NORMAL
PATH REPORT.RELEVANT HX SPEC: NORMAL

## 2022-10-22 ENCOUNTER — HEALTH MAINTENANCE LETTER (OUTPATIENT)
Age: 27
End: 2022-10-22

## 2023-04-01 ENCOUNTER — HEALTH MAINTENANCE LETTER (OUTPATIENT)
Age: 28
End: 2023-04-01

## 2024-01-11 ENCOUNTER — LAB REQUISITION (OUTPATIENT)
Dept: LAB | Facility: CLINIC | Age: 29
End: 2024-01-11
Payer: COMMERCIAL

## 2024-01-11 DIAGNOSIS — Z34.81 ENCOUNTER FOR SUPERVISION OF OTHER NORMAL PREGNANCY, FIRST TRIMESTER: ICD-10-CM

## 2024-01-11 LAB
ABO/RH(D): NORMAL
ANTIBODY SCREEN: NEGATIVE
BASOPHILS # BLD AUTO: 0.1 10E3/UL (ref 0–0.2)
BASOPHILS NFR BLD AUTO: 1 %
EOSINOPHIL # BLD AUTO: 0.2 10E3/UL (ref 0–0.7)
EOSINOPHIL NFR BLD AUTO: 2 %
ERYTHROCYTE [DISTWIDTH] IN BLOOD BY AUTOMATED COUNT: 12.9 % (ref 10–15)
HBA1C MFR BLD: 5.3 %
HCT VFR BLD AUTO: 35.8 % (ref 35–47)
HCV AB SERPL QL IA: NONREACTIVE
HGB BLD-MCNC: 11.6 G/DL (ref 11.7–15.7)
HIV 1+2 AB+HIV1 P24 AG SERPL QL IA: NONREACTIVE
IMM GRANULOCYTES # BLD: 0 10E3/UL
IMM GRANULOCYTES NFR BLD: 0 %
LYMPHOCYTES # BLD AUTO: 2 10E3/UL (ref 0.8–5.3)
LYMPHOCYTES NFR BLD AUTO: 27 %
MCH RBC QN AUTO: 28.8 PG (ref 26.5–33)
MCHC RBC AUTO-ENTMCNC: 32.4 G/DL (ref 31.5–36.5)
MCV RBC AUTO: 89 FL (ref 78–100)
MONOCYTES # BLD AUTO: 0.4 10E3/UL (ref 0–1.3)
MONOCYTES NFR BLD AUTO: 5 %
NEUTROPHILS # BLD AUTO: 4.9 10E3/UL (ref 1.6–8.3)
NEUTROPHILS NFR BLD AUTO: 65 %
NRBC # BLD AUTO: 0 10E3/UL
NRBC BLD AUTO-RTO: 0 /100
PLATELET # BLD AUTO: 229 10E3/UL (ref 150–450)
RBC # BLD AUTO: 4.03 10E6/UL (ref 3.8–5.2)
RUBV IGG SERPL QL IA: 1.04 INDEX
RUBV IGG SERPL QL IA: POSITIVE
SPECIMEN EXPIRATION DATE: NORMAL
WBC # BLD AUTO: 7.5 10E3/UL (ref 4–11)

## 2024-01-11 PROCEDURE — 86592 SYPHILIS TEST NON-TREP QUAL: CPT | Mod: ORL | Performed by: MIDWIFE

## 2024-01-11 PROCEDURE — 86803 HEPATITIS C AB TEST: CPT | Mod: ORL | Performed by: MIDWIFE

## 2024-01-11 PROCEDURE — 86762 RUBELLA ANTIBODY: CPT | Mod: ORL | Performed by: MIDWIFE

## 2024-01-11 PROCEDURE — 87086 URINE CULTURE/COLONY COUNT: CPT | Mod: ORL | Performed by: MIDWIFE

## 2024-01-11 PROCEDURE — 87389 HIV-1 AG W/HIV-1&-2 AB AG IA: CPT | Mod: ORL | Performed by: MIDWIFE

## 2024-01-11 PROCEDURE — 83036 HEMOGLOBIN GLYCOSYLATED A1C: CPT | Performed by: MIDWIFE

## 2024-01-11 PROCEDURE — 87340 HEPATITIS B SURFACE AG IA: CPT | Mod: ORL | Performed by: MIDWIFE

## 2024-01-11 PROCEDURE — 85025 COMPLETE CBC W/AUTO DIFF WBC: CPT | Mod: ORL | Performed by: MIDWIFE

## 2024-01-11 PROCEDURE — 87491 CHLMYD TRACH DNA AMP PROBE: CPT | Mod: ORL | Performed by: MIDWIFE

## 2024-01-11 PROCEDURE — 86900 BLOOD TYPING SEROLOGIC ABO: CPT | Mod: ORL | Performed by: MIDWIFE

## 2024-01-11 PROCEDURE — 86592 SYPHILIS TEST NON-TREP QUAL: CPT | Performed by: MIDWIFE

## 2024-01-12 LAB
BACTERIA UR CULT: NORMAL
C TRACH DNA SPEC QL PROBE+SIG AMP: NEGATIVE
HBV SURFACE AG SERPL QL IA: NONREACTIVE
N GONORRHOEA DNA SPEC QL NAA+PROBE: NEGATIVE
RPR SER QL: NONREACTIVE

## 2024-05-31 ENCOUNTER — LAB REQUISITION (OUTPATIENT)
Dept: LAB | Facility: CLINIC | Age: 29
End: 2024-05-31
Payer: COMMERCIAL

## 2024-05-31 DIAGNOSIS — Z36.89 ENCOUNTER FOR OTHER SPECIFIED ANTENATAL SCREENING: ICD-10-CM

## 2024-05-31 LAB
ERYTHROCYTE [DISTWIDTH] IN BLOOD BY AUTOMATED COUNT: 12.8 % (ref 10–15)
HCT VFR BLD AUTO: 33.8 % (ref 35–47)
HGB BLD-MCNC: 10.6 G/DL (ref 11.7–15.7)
MCH RBC QN AUTO: 27.9 PG (ref 26.5–33)
MCHC RBC AUTO-ENTMCNC: 31.4 G/DL (ref 31.5–36.5)
MCV RBC AUTO: 89 FL (ref 78–100)
PLATELET # BLD AUTO: 197 10E3/UL (ref 150–450)
RBC # BLD AUTO: 3.8 10E6/UL (ref 3.8–5.2)
WBC # BLD AUTO: 10 10E3/UL (ref 4–11)

## 2024-05-31 PROCEDURE — 86592 SYPHILIS TEST NON-TREP QUAL: CPT | Mod: ORL | Performed by: MIDWIFE

## 2024-05-31 PROCEDURE — 85027 COMPLETE CBC AUTOMATED: CPT | Mod: ORL | Performed by: MIDWIFE

## 2024-06-02 ENCOUNTER — HEALTH MAINTENANCE LETTER (OUTPATIENT)
Age: 29
End: 2024-06-02

## 2024-06-03 LAB — RPR SER QL: NONREACTIVE

## 2024-07-25 ENCOUNTER — LAB REQUISITION (OUTPATIENT)
Dept: LAB | Facility: CLINIC | Age: 29
End: 2024-07-25
Payer: COMMERCIAL

## 2024-07-25 DIAGNOSIS — Z36.89 ENCOUNTER FOR OTHER SPECIFIED ANTENATAL SCREENING: ICD-10-CM

## 2024-07-25 PROCEDURE — 87653 STREP B DNA AMP PROBE: CPT | Mod: ORL | Performed by: MIDWIFE

## 2024-07-26 LAB — GP B STREP DNA SPEC QL NAA+PROBE: NEGATIVE

## 2024-09-03 ENCOUNTER — HOSPITAL ENCOUNTER (INPATIENT)
Facility: CLINIC | Age: 29
LOS: 3 days | Discharge: HOME HEALTH | End: 2024-09-06
Attending: OBSTETRICS & GYNECOLOGY | Admitting: MIDWIFE
Payer: COMMERCIAL

## 2024-09-03 ENCOUNTER — ANESTHESIA (OUTPATIENT)
Dept: OBGYN | Facility: CLINIC | Age: 29
End: 2024-09-03
Payer: COMMERCIAL

## 2024-09-03 ENCOUNTER — ANESTHESIA EVENT (OUTPATIENT)
Dept: OBGYN | Facility: CLINIC | Age: 29
End: 2024-09-03
Payer: COMMERCIAL

## 2024-09-03 LAB
ABO/RH(D): NORMAL
ANTIBODY SCREEN: NEGATIVE
HGB BLD-MCNC: 12.1 G/DL (ref 11.7–15.7)
SPECIMEN EXPIRATION DATE: NORMAL
T PALLIDUM AB SER QL: NONREACTIVE

## 2024-09-03 PROCEDURE — 59400 OBSTETRICAL CARE: CPT | Performed by: ANESTHESIOLOGY

## 2024-09-03 PROCEDURE — 36415 COLL VENOUS BLD VENIPUNCTURE: CPT | Performed by: MIDWIFE

## 2024-09-03 PROCEDURE — 3E0R3BZ INTRODUCTION OF ANESTHETIC AGENT INTO SPINAL CANAL, PERCUTANEOUS APPROACH: ICD-10-PCS | Performed by: ANESTHESIOLOGY

## 2024-09-03 PROCEDURE — 250N000011 HC RX IP 250 OP 636: Performed by: ANESTHESIOLOGY

## 2024-09-03 PROCEDURE — 250N000009 HC RX 250: Performed by: ANESTHESIOLOGY

## 2024-09-03 PROCEDURE — 3E033VJ INTRODUCTION OF OTHER HORMONE INTO PERIPHERAL VEIN, PERCUTANEOUS APPROACH: ICD-10-PCS | Performed by: MIDWIFE

## 2024-09-03 PROCEDURE — 250N000009 HC RX 250: Performed by: MIDWIFE

## 2024-09-03 PROCEDURE — 120N000001 HC R&B MED SURG/OB

## 2024-09-03 PROCEDURE — 86923 COMPATIBILITY TEST ELECTRIC: CPT | Performed by: OBSTETRICS & GYNECOLOGY

## 2024-09-03 PROCEDURE — 10907ZC DRAINAGE OF AMNIOTIC FLUID, THERAPEUTIC FROM PRODUCTS OF CONCEPTION, VIA NATURAL OR ARTIFICIAL OPENING: ICD-10-PCS | Performed by: MIDWIFE

## 2024-09-03 PROCEDURE — 85018 HEMOGLOBIN: CPT | Performed by: MIDWIFE

## 2024-09-03 PROCEDURE — 86900 BLOOD TYPING SEROLOGIC ABO: CPT | Performed by: MIDWIFE

## 2024-09-03 PROCEDURE — 86780 TREPONEMA PALLIDUM: CPT | Performed by: MIDWIFE

## 2024-09-03 PROCEDURE — 258N000003 HC RX IP 258 OP 636: Performed by: MIDWIFE

## 2024-09-03 PROCEDURE — 00HU33Z INSERTION OF INFUSION DEVICE INTO SPINAL CANAL, PERCUTANEOUS APPROACH: ICD-10-PCS | Performed by: ANESTHESIOLOGY

## 2024-09-03 PROCEDURE — 370N000003 HC ANESTHESIA WARD SERVICE: Performed by: ANESTHESIOLOGY

## 2024-09-03 RX ORDER — CITRIC ACID/SODIUM CITRATE 334-500MG
30 SOLUTION, ORAL ORAL
Status: DISCONTINUED | OUTPATIENT
Start: 2024-09-03 | End: 2024-09-04 | Stop reason: HOSPADM

## 2024-09-03 RX ORDER — EPHEDRINE SULFATE 50 MG/ML
5 INJECTION, SOLUTION INTRAMUSCULAR; INTRAVENOUS; SUBCUTANEOUS
Status: DISCONTINUED | OUTPATIENT
Start: 2024-09-03 | End: 2024-09-05

## 2024-09-03 RX ORDER — NALOXONE HYDROCHLORIDE 0.4 MG/ML
0.4 INJECTION, SOLUTION INTRAMUSCULAR; INTRAVENOUS; SUBCUTANEOUS
Status: DISCONTINUED | OUTPATIENT
Start: 2024-09-03 | End: 2024-09-04 | Stop reason: HOSPADM

## 2024-09-03 RX ORDER — NALOXONE HYDROCHLORIDE 0.4 MG/ML
0.2 INJECTION, SOLUTION INTRAMUSCULAR; INTRAVENOUS; SUBCUTANEOUS
Status: DISCONTINUED | OUTPATIENT
Start: 2024-09-03 | End: 2024-09-04 | Stop reason: HOSPADM

## 2024-09-03 RX ORDER — FENTANYL CITRATE 50 UG/ML
100 INJECTION, SOLUTION INTRAMUSCULAR; INTRAVENOUS
Status: DISCONTINUED | OUTPATIENT
Start: 2024-09-03 | End: 2024-09-04 | Stop reason: HOSPADM

## 2024-09-03 RX ORDER — MISOPROSTOL 200 UG/1
400 TABLET ORAL
Status: DISCONTINUED | OUTPATIENT
Start: 2024-09-03 | End: 2024-09-04 | Stop reason: HOSPADM

## 2024-09-03 RX ORDER — OXYTOCIN 10 [USP'U]/ML
10 INJECTION, SOLUTION INTRAMUSCULAR; INTRAVENOUS
Status: DISCONTINUED | OUTPATIENT
Start: 2024-09-03 | End: 2024-09-04 | Stop reason: HOSPADM

## 2024-09-03 RX ORDER — LIDOCAINE 40 MG/G
CREAM TOPICAL
Status: DISCONTINUED | OUTPATIENT
Start: 2024-09-03 | End: 2024-09-04 | Stop reason: HOSPADM

## 2024-09-03 RX ORDER — OXYTOCIN 10 [USP'U]/ML
10 INJECTION, SOLUTION INTRAMUSCULAR; INTRAVENOUS
Status: DISCONTINUED | OUTPATIENT
Start: 2024-09-03 | End: 2024-09-04

## 2024-09-03 RX ORDER — ONDANSETRON 4 MG/1
4 TABLET, ORALLY DISINTEGRATING ORAL EVERY 6 HOURS PRN
Status: DISCONTINUED | OUTPATIENT
Start: 2024-09-03 | End: 2024-09-04 | Stop reason: HOSPADM

## 2024-09-03 RX ORDER — OXYTOCIN/0.9 % SODIUM CHLORIDE 30/500 ML
340 PLASTIC BAG, INJECTION (ML) INTRAVENOUS CONTINUOUS PRN
Status: DISCONTINUED | OUTPATIENT
Start: 2024-09-03 | End: 2024-09-04 | Stop reason: HOSPADM

## 2024-09-03 RX ORDER — LIDOCAINE HCL/EPINEPHRINE/PF 2%-1:200K
VIAL (ML) INJECTION
Status: COMPLETED | OUTPATIENT
Start: 2024-09-03 | End: 2024-09-03

## 2024-09-03 RX ORDER — OXYTOCIN/0.9 % SODIUM CHLORIDE 30/500 ML
100-340 PLASTIC BAG, INJECTION (ML) INTRAVENOUS CONTINUOUS PRN
Status: DISCONTINUED | OUTPATIENT
Start: 2024-09-03 | End: 2024-09-04

## 2024-09-03 RX ORDER — ONDANSETRON 2 MG/ML
4 INJECTION INTRAMUSCULAR; INTRAVENOUS EVERY 6 HOURS PRN
Status: DISCONTINUED | OUTPATIENT
Start: 2024-09-03 | End: 2024-09-04 | Stop reason: HOSPADM

## 2024-09-03 RX ORDER — KETOROLAC TROMETHAMINE 30 MG/ML
30 INJECTION, SOLUTION INTRAMUSCULAR; INTRAVENOUS
Status: DISCONTINUED | OUTPATIENT
Start: 2024-09-03 | End: 2024-09-04

## 2024-09-03 RX ORDER — LOPERAMIDE HCL 2 MG
2 CAPSULE ORAL
Status: DISCONTINUED | OUTPATIENT
Start: 2024-09-03 | End: 2024-09-04 | Stop reason: HOSPADM

## 2024-09-03 RX ORDER — MULTIVITAMIN WITH IRON
1 TABLET ORAL DAILY
COMMUNITY

## 2024-09-03 RX ORDER — PROCHLORPERAZINE 25 MG
25 SUPPOSITORY, RECTAL RECTAL EVERY 12 HOURS PRN
Status: DISCONTINUED | OUTPATIENT
Start: 2024-09-03 | End: 2024-09-04 | Stop reason: HOSPADM

## 2024-09-03 RX ORDER — CARBOPROST TROMETHAMINE 250 UG/ML
250 INJECTION, SOLUTION INTRAMUSCULAR
Status: DISCONTINUED | OUTPATIENT
Start: 2024-09-03 | End: 2024-09-04 | Stop reason: HOSPADM

## 2024-09-03 RX ORDER — OXYTOCIN/0.9 % SODIUM CHLORIDE 30/500 ML
1-24 PLASTIC BAG, INJECTION (ML) INTRAVENOUS CONTINUOUS
Status: DISCONTINUED | OUTPATIENT
Start: 2024-09-03 | End: 2024-09-04 | Stop reason: HOSPADM

## 2024-09-03 RX ORDER — SWAB
1 SWAB, NON-MEDICATED MISCELLANEOUS DAILY
COMMUNITY

## 2024-09-03 RX ORDER — SODIUM CHLORIDE, SODIUM LACTATE, POTASSIUM CHLORIDE, CALCIUM CHLORIDE 600; 310; 30; 20 MG/100ML; MG/100ML; MG/100ML; MG/100ML
INJECTION, SOLUTION INTRAVENOUS CONTINUOUS PRN
Status: DISCONTINUED | OUTPATIENT
Start: 2024-09-03 | End: 2024-09-04 | Stop reason: HOSPADM

## 2024-09-03 RX ORDER — METOCLOPRAMIDE HYDROCHLORIDE 5 MG/ML
10 INJECTION INTRAMUSCULAR; INTRAVENOUS EVERY 6 HOURS PRN
Status: DISCONTINUED | OUTPATIENT
Start: 2024-09-03 | End: 2024-09-04 | Stop reason: HOSPADM

## 2024-09-03 RX ORDER — LOPERAMIDE HCL 2 MG
4 CAPSULE ORAL
Status: DISCONTINUED | OUTPATIENT
Start: 2024-09-03 | End: 2024-09-04 | Stop reason: HOSPADM

## 2024-09-03 RX ORDER — PROCHLORPERAZINE MALEATE 5 MG
10 TABLET ORAL EVERY 6 HOURS PRN
Status: DISCONTINUED | OUTPATIENT
Start: 2024-09-03 | End: 2024-09-04 | Stop reason: HOSPADM

## 2024-09-03 RX ORDER — METHYLERGONOVINE MALEATE 0.2 MG/ML
200 INJECTION INTRAVENOUS
Status: DISCONTINUED | OUTPATIENT
Start: 2024-09-03 | End: 2024-09-04 | Stop reason: HOSPADM

## 2024-09-03 RX ORDER — TRANEXAMIC ACID 10 MG/ML
1 INJECTION, SOLUTION INTRAVENOUS EVERY 30 MIN PRN
Status: DISCONTINUED | OUTPATIENT
Start: 2024-09-03 | End: 2024-09-04 | Stop reason: HOSPADM

## 2024-09-03 RX ORDER — METOCLOPRAMIDE 10 MG/1
10 TABLET ORAL EVERY 6 HOURS PRN
Status: DISCONTINUED | OUTPATIENT
Start: 2024-09-03 | End: 2024-09-04 | Stop reason: HOSPADM

## 2024-09-03 RX ORDER — MISOPROSTOL 200 UG/1
800 TABLET ORAL
Status: DISCONTINUED | OUTPATIENT
Start: 2024-09-03 | End: 2024-09-04 | Stop reason: HOSPADM

## 2024-09-03 RX ORDER — IBUPROFEN 400 MG/1
800 TABLET, FILM COATED ORAL
Status: DISCONTINUED | OUTPATIENT
Start: 2024-09-03 | End: 2024-09-04

## 2024-09-03 RX ADMIN — LIDOCAINE HYDROCHLORIDE,EPINEPHRINE BITARTRATE 5 ML: 20; .005 INJECTION, SOLUTION EPIDURAL; INFILTRATION; INTRACAUDAL; PERINEURAL at 20:41

## 2024-09-03 RX ADMIN — Medication 2 MILLI-UNITS/MIN: at 09:43

## 2024-09-03 RX ADMIN — Medication: at 20:33

## 2024-09-03 RX ADMIN — SODIUM CHLORIDE, POTASSIUM CHLORIDE, SODIUM LACTATE AND CALCIUM CHLORIDE 75 ML/HR: 600; 310; 30; 20 INJECTION, SOLUTION INTRAVENOUS at 09:47

## 2024-09-03 ASSESSMENT — ACTIVITIES OF DAILY LIVING (ADL)
ADLS_ACUITY_SCORE: 18

## 2024-09-03 NOTE — PROGRESS NOTES
OB Progress Note  9/3/2024  12:31 PM    S:  Feeling like contractions have become a little stronger over the last 30min      O:  /55   Temp 97.9  F (36.6  C) (Temporal)   Resp 16   EFM: baseline 130, accelerations present, neg decelerations, mod variability; Category I  [place]:  Ctx q5-6 min  SVE:  3/70%/-2  Membranes: intact  Pitocin at 10mu/min    A/P:  29 year old  @41w4d, Cat I, Pitocin  1.  Routine care  2.  Continue with Pitocin per protocol  3.  AROM when fetal head lower  4. Re evaluate as needed    Kateryna Saldaña CNM

## 2024-09-03 NOTE — PROGRESS NOTES
OB Progress Note  9/3/2024  5:14 PM    S:  Has not felt much change in contractions.  Agrees to AROM      O:  /52   Temp (P) 98.1  F (36.7  C) (Temporal)   Resp (P) 16   EFM: baseline 140, accelerations present, neg decelerations, mod variability; Category I  [place]:  Ctx q2-5 min  SVE:  4/80%/-1  Membranes: AROM, clear fluid  Pitocin at 16mu/min    A/P:  29 year old  @41w4d, Cat I, Pitocin, AROM, afebrile  1.  Routine care  2.  Continue Pitocin per protocol  3.  Epidural prn  4.  Anticipate     Kateryna Saldaña CNM

## 2024-09-03 NOTE — H&P
OB Brief Admit H&P    No significant change in general health status based on examination of the patient, review of Nursing Admission Database and prenatal record.    Pt is a 29 year old  @ 41w4d who presented to L&D for IOL for post term dates.  Reports good fetal movement and occasional uterine cramping. Denies vaginal bleeding or leaking of fluid.  Plans epidural for pain management    Patient's prenatal course has been complicated by:  Arcuate uterus, no problems in this pregnancy or prior one, growth all normal    Prenatal Labs:    Blood type A pos  Rubella immune  [id number]  GBS negative    EFW: 7lbs    /55   Temp 97.9  F (36.6  C) (Temporal)   Resp 16   EFM:  140  [place]: occasional  SVE: 3/60%/-2  Membranes:  intact    Assessment:  29 year old  @ 41w4d admitted for IOL for post term dates      Plan:  1. Admit to labor and delivery   2. Pitocin per protocol  3. AROM when fetal head lower  4. Re eval as needed    Kateryna Saldaña CNM  9/3/2024  12:24 PM

## 2024-09-03 NOTE — PROGRESS NOTES
Pt presents to unit for IOL at 41.4 weeks gestation, accompanied by . Pt denies ctx, LOF, and vaginal bleeding. Reports good fetal movement. Pt oriented to room 212. POC reviewed with pt. EFM applied.

## 2024-09-04 LAB
APTT PPP: 28 SECONDS (ref 22–38)
APTT PPP: 29 SECONDS (ref 22–38)
APTT PPP: 31 SECONDS (ref 22–38)
BASOPHILS # BLD AUTO: 0 10E3/UL (ref 0–0.2)
BASOPHILS # BLD AUTO: 0.1 10E3/UL (ref 0–0.2)
BASOPHILS NFR BLD AUTO: 0 %
BASOPHILS NFR BLD AUTO: 0 %
BLD PROD TYP BPU: NORMAL
BLOOD COMPONENT TYPE: NORMAL
CODING SYSTEM: NORMAL
CROSSMATCH: NORMAL
D DIMER PPP FEU-MCNC: 1.88 UG/ML FEU (ref 0–0.5)
EOSINOPHIL # BLD AUTO: 0 10E3/UL (ref 0–0.7)
EOSINOPHIL # BLD AUTO: 0 10E3/UL (ref 0–0.7)
EOSINOPHIL NFR BLD AUTO: 0 %
EOSINOPHIL NFR BLD AUTO: 0 %
ERYTHROCYTE [DISTWIDTH] IN BLOOD BY AUTOMATED COUNT: 13.9 % (ref 10–15)
ERYTHROCYTE [DISTWIDTH] IN BLOOD BY AUTOMATED COUNT: 14 % (ref 10–15)
ERYTHROCYTE [DISTWIDTH] IN BLOOD BY AUTOMATED COUNT: 14.1 % (ref 10–15)
ERYTHROCYTE [DISTWIDTH] IN BLOOD BY AUTOMATED COUNT: 14.3 % (ref 10–15)
FIBRINOGEN PPP-MCNC: 251 MG/DL (ref 170–510)
FIBRINOGEN PPP-MCNC: 267 MG/DL (ref 170–510)
FIBRINOGEN PPP-MCNC: 274 MG/DL (ref 170–510)
HCT VFR BLD AUTO: 31.9 % (ref 35–47)
HCT VFR BLD AUTO: 33.8 % (ref 35–47)
HCT VFR BLD AUTO: 34.3 % (ref 35–47)
HCT VFR BLD AUTO: 41.5 % (ref 35–47)
HGB BLD-MCNC: 10.6 G/DL (ref 11.7–15.7)
HGB BLD-MCNC: 11.2 G/DL (ref 11.7–15.7)
HGB BLD-MCNC: 11.5 G/DL (ref 11.7–15.7)
HGB BLD-MCNC: 13.7 G/DL (ref 11.7–15.7)
IMM GRANULOCYTES # BLD: 0.2 10E3/UL
IMM GRANULOCYTES # BLD: 0.2 10E3/UL
IMM GRANULOCYTES NFR BLD: 1 %
IMM GRANULOCYTES NFR BLD: 1 %
INR PPP: 1 (ref 0.85–1.15)
INR PPP: 1.03 (ref 0.85–1.15)
INR PPP: 1.04 (ref 0.85–1.15)
ISSUE DATE AND TIME: NORMAL
LYMPHOCYTES # BLD AUTO: 1.8 10E3/UL (ref 0.8–5.3)
LYMPHOCYTES # BLD AUTO: 2.2 10E3/UL (ref 0.8–5.3)
LYMPHOCYTES NFR BLD AUTO: 14 %
LYMPHOCYTES NFR BLD AUTO: 9 %
MCH RBC QN AUTO: 28.6 PG (ref 26.5–33)
MCH RBC QN AUTO: 28.9 PG (ref 26.5–33)
MCH RBC QN AUTO: 28.9 PG (ref 26.5–33)
MCH RBC QN AUTO: 29.3 PG (ref 26.5–33)
MCHC RBC AUTO-ENTMCNC: 33 G/DL (ref 31.5–36.5)
MCHC RBC AUTO-ENTMCNC: 33.1 G/DL (ref 31.5–36.5)
MCHC RBC AUTO-ENTMCNC: 33.2 G/DL (ref 31.5–36.5)
MCHC RBC AUTO-ENTMCNC: 33.5 G/DL (ref 31.5–36.5)
MCV RBC AUTO: 86 FL (ref 78–100)
MCV RBC AUTO: 86 FL (ref 78–100)
MCV RBC AUTO: 87 FL (ref 78–100)
MCV RBC AUTO: 89 FL (ref 78–100)
MONOCYTES # BLD AUTO: 0.8 10E3/UL (ref 0–1.3)
MONOCYTES # BLD AUTO: 0.8 10E3/UL (ref 0–1.3)
MONOCYTES NFR BLD AUTO: 4 %
MONOCYTES NFR BLD AUTO: 5 %
NEUTROPHILS # BLD AUTO: 12.5 10E3/UL (ref 1.6–8.3)
NEUTROPHILS # BLD AUTO: 17.7 10E3/UL (ref 1.6–8.3)
NEUTROPHILS NFR BLD AUTO: 79 %
NEUTROPHILS NFR BLD AUTO: 86 %
NRBC # BLD AUTO: 0 10E3/UL
NRBC # BLD AUTO: 0 10E3/UL
NRBC BLD AUTO-RTO: 0 /100
NRBC BLD AUTO-RTO: 0 /100
PLATELET # BLD AUTO: 111 10E3/UL (ref 150–450)
PLATELET # BLD AUTO: 125 10E3/UL (ref 150–450)
PLATELET # BLD AUTO: 144 10E3/UL (ref 150–450)
PLATELET # BLD AUTO: 204 10E3/UL (ref 150–450)
RBC # BLD AUTO: 3.71 10E6/UL (ref 3.8–5.2)
RBC # BLD AUTO: 3.87 10E6/UL (ref 3.8–5.2)
RBC # BLD AUTO: 3.98 10E6/UL (ref 3.8–5.2)
RBC # BLD AUTO: 4.68 10E6/UL (ref 3.8–5.2)
UNIT ABO/RH: NORMAL
UNIT NUMBER: NORMAL
UNIT STATUS: NORMAL
UNIT TYPE ISBT: 6200
WBC # BLD AUTO: 15.8 10E3/UL (ref 4–11)
WBC # BLD AUTO: 17.9 10E3/UL (ref 4–11)
WBC # BLD AUTO: 18.2 10E3/UL (ref 4–11)
WBC # BLD AUTO: 20.6 10E3/UL (ref 4–11)

## 2024-09-04 PROCEDURE — 120N000013 HC R&B IMCU

## 2024-09-04 PROCEDURE — 85384 FIBRINOGEN ACTIVITY: CPT | Performed by: OBSTETRICS & GYNECOLOGY

## 2024-09-04 PROCEDURE — 85730 THROMBOPLASTIN TIME PARTIAL: CPT | Performed by: OBSTETRICS & GYNECOLOGY

## 2024-09-04 PROCEDURE — P9037 PLATE PHERES LEUKOREDU IRRAD: HCPCS

## 2024-09-04 PROCEDURE — 99291 CRITICAL CARE FIRST HOUR: CPT

## 2024-09-04 PROCEDURE — 250N000009 HC RX 250: Performed by: MIDWIFE

## 2024-09-04 PROCEDURE — 85025 COMPLETE CBC W/AUTO DIFF WBC: CPT | Performed by: OBSTETRICS & GYNECOLOGY

## 2024-09-04 PROCEDURE — 88307 TISSUE EXAM BY PATHOLOGIST: CPT | Mod: TC | Performed by: OBSTETRICS & GYNECOLOGY

## 2024-09-04 PROCEDURE — 722N000001 HC LABOR CARE VAGINAL DELIVERY SINGLE

## 2024-09-04 PROCEDURE — 0W3R7ZZ CONTROL BLEEDING IN GENITOURINARY TRACT, VIA NATURAL OR ARTIFICIAL OPENING: ICD-10-PCS | Performed by: OBSTETRICS & GYNECOLOGY

## 2024-09-04 PROCEDURE — P9016 RBC LEUKOCYTES REDUCED: HCPCS | Performed by: OBSTETRICS & GYNECOLOGY

## 2024-09-04 PROCEDURE — 250N000013 HC RX MED GY IP 250 OP 250 PS 637: Performed by: OBSTETRICS & GYNECOLOGY

## 2024-09-04 PROCEDURE — 272N000758 HC JADA SYSTEM POST-PARTUM HEMORRAGE CONTROL

## 2024-09-04 PROCEDURE — 10D17ZZ EXTRACTION OF PRODUCTS OF CONCEPTION, RETAINED, VIA NATURAL OR ARTIFICIAL OPENING: ICD-10-PCS | Performed by: OBSTETRICS & GYNECOLOGY

## 2024-09-04 PROCEDURE — P9059 PLASMA, FRZ BETWEEN 8-24HOUR: HCPCS

## 2024-09-04 PROCEDURE — 250N000013 HC RX MED GY IP 250 OP 250 PS 637: Performed by: MIDWIFE

## 2024-09-04 PROCEDURE — 85610 PROTHROMBIN TIME: CPT | Performed by: OBSTETRICS & GYNECOLOGY

## 2024-09-04 PROCEDURE — 85379 FIBRIN DEGRADATION QUANT: CPT | Performed by: OBSTETRICS & GYNECOLOGY

## 2024-09-04 PROCEDURE — P9016 RBC LEUKOCYTES REDUCED: HCPCS

## 2024-09-04 PROCEDURE — 36415 COLL VENOUS BLD VENIPUNCTURE: CPT | Performed by: OBSTETRICS & GYNECOLOGY

## 2024-09-04 PROCEDURE — 85027 COMPLETE CBC AUTOMATED: CPT | Performed by: OBSTETRICS & GYNECOLOGY

## 2024-09-04 PROCEDURE — P9012 CRYOPRECIPITATE EACH UNIT: HCPCS

## 2024-09-04 PROCEDURE — 250N000011 HC RX IP 250 OP 636: Performed by: MIDWIFE

## 2024-09-04 RX ORDER — OXYTOCIN 10 [USP'U]/ML
10 INJECTION, SOLUTION INTRAMUSCULAR; INTRAVENOUS
Status: DISCONTINUED | OUTPATIENT
Start: 2024-09-04 | End: 2024-09-06 | Stop reason: HOSPADM

## 2024-09-04 RX ORDER — UREA 10 %
45 LOTION (ML) TOPICAL
Status: DISCONTINUED | OUTPATIENT
Start: 2024-09-04 | End: 2024-09-06 | Stop reason: HOSPADM

## 2024-09-04 RX ORDER — BISACODYL 10 MG
10 SUPPOSITORY, RECTAL RECTAL DAILY PRN
Status: DISCONTINUED | OUTPATIENT
Start: 2024-09-04 | End: 2024-09-06 | Stop reason: HOSPADM

## 2024-09-04 RX ORDER — METHYLERGONOVINE MALEATE 0.2 MG/1
200 TABLET ORAL 3 TIMES DAILY
Status: COMPLETED | OUTPATIENT
Start: 2024-09-04 | End: 2024-09-04

## 2024-09-04 RX ORDER — TRANEXAMIC ACID 10 MG/ML
1 INJECTION, SOLUTION INTRAVENOUS EVERY 30 MIN PRN
Status: DISCONTINUED | OUTPATIENT
Start: 2024-09-04 | End: 2024-09-06 | Stop reason: HOSPADM

## 2024-09-04 RX ORDER — CEFAZOLIN SODIUM 1 G/3ML
1 INJECTION, POWDER, FOR SOLUTION INTRAMUSCULAR; INTRAVENOUS ONCE
Status: COMPLETED | OUTPATIENT
Start: 2024-09-04 | End: 2024-09-04

## 2024-09-04 RX ORDER — IBUPROFEN 400 MG/1
800 TABLET, FILM COATED ORAL EVERY 6 HOURS PRN
Status: DISCONTINUED | OUTPATIENT
Start: 2024-09-04 | End: 2024-09-06 | Stop reason: HOSPADM

## 2024-09-04 RX ORDER — DOCUSATE SODIUM 100 MG/1
100 CAPSULE, LIQUID FILLED ORAL DAILY
Status: DISCONTINUED | OUTPATIENT
Start: 2024-09-04 | End: 2024-09-06 | Stop reason: HOSPADM

## 2024-09-04 RX ORDER — NALOXONE HYDROCHLORIDE 0.4 MG/ML
0.2 INJECTION, SOLUTION INTRAMUSCULAR; INTRAVENOUS; SUBCUTANEOUS
Status: DISCONTINUED | OUTPATIENT
Start: 2024-09-04 | End: 2024-09-06 | Stop reason: HOSPADM

## 2024-09-04 RX ORDER — OXYTOCIN/0.9 % SODIUM CHLORIDE 30/500 ML
340 PLASTIC BAG, INJECTION (ML) INTRAVENOUS CONTINUOUS PRN
Status: DISCONTINUED | OUTPATIENT
Start: 2024-09-04 | End: 2024-09-06 | Stop reason: HOSPADM

## 2024-09-04 RX ORDER — LOPERAMIDE HCL 2 MG
2 CAPSULE ORAL
Status: DISCONTINUED | OUTPATIENT
Start: 2024-09-04 | End: 2024-09-06 | Stop reason: HOSPADM

## 2024-09-04 RX ORDER — LOPERAMIDE HCL 2 MG
4 CAPSULE ORAL
Status: DISCONTINUED | OUTPATIENT
Start: 2024-09-04 | End: 2024-09-06 | Stop reason: HOSPADM

## 2024-09-04 RX ORDER — MODIFIED LANOLIN
OINTMENT (GRAM) TOPICAL
Status: DISCONTINUED | OUTPATIENT
Start: 2024-09-04 | End: 2024-09-06 | Stop reason: HOSPADM

## 2024-09-04 RX ORDER — ACETAMINOPHEN 325 MG/1
650 TABLET ORAL EVERY 4 HOURS PRN
Status: DISCONTINUED | OUTPATIENT
Start: 2024-09-04 | End: 2024-09-06 | Stop reason: HOSPADM

## 2024-09-04 RX ORDER — CEFAZOLIN SODIUM 1 G/3ML
INJECTION, POWDER, FOR SOLUTION INTRAMUSCULAR; INTRAVENOUS
Status: COMPLETED
Start: 2024-09-04 | End: 2024-09-04

## 2024-09-04 RX ORDER — NALOXONE HYDROCHLORIDE 0.4 MG/ML
0.4 INJECTION, SOLUTION INTRAMUSCULAR; INTRAVENOUS; SUBCUTANEOUS
Status: DISCONTINUED | OUTPATIENT
Start: 2024-09-04 | End: 2024-09-06 | Stop reason: HOSPADM

## 2024-09-04 RX ORDER — PRENATAL VIT/IRON FUM/FOLIC AC 27MG-0.8MG
1 TABLET ORAL DAILY
Status: DISCONTINUED | OUTPATIENT
Start: 2024-09-04 | End: 2024-09-06 | Stop reason: HOSPADM

## 2024-09-04 RX ORDER — HYDROCORTISONE 25 MG/G
CREAM TOPICAL 3 TIMES DAILY PRN
Status: DISCONTINUED | OUTPATIENT
Start: 2024-09-04 | End: 2024-09-06 | Stop reason: HOSPADM

## 2024-09-04 RX ORDER — METHYLERGONOVINE MALEATE 0.2 MG/ML
200 INJECTION INTRAVENOUS
Status: DISCONTINUED | OUTPATIENT
Start: 2024-09-04 | End: 2024-09-06 | Stop reason: HOSPADM

## 2024-09-04 RX ORDER — MISOPROSTOL 200 UG/1
800 TABLET ORAL
Status: DISCONTINUED | OUTPATIENT
Start: 2024-09-04 | End: 2024-09-06 | Stop reason: HOSPADM

## 2024-09-04 RX ORDER — MISOPROSTOL 200 UG/1
400 TABLET ORAL
Status: DISCONTINUED | OUTPATIENT
Start: 2024-09-04 | End: 2024-09-06 | Stop reason: HOSPADM

## 2024-09-04 RX ORDER — CARBOPROST TROMETHAMINE 250 UG/ML
250 INJECTION, SOLUTION INTRAMUSCULAR
Status: DISCONTINUED | OUTPATIENT
Start: 2024-09-04 | End: 2024-09-06 | Stop reason: HOSPADM

## 2024-09-04 RX ADMIN — MISOPROSTOL 800 MCG: 200 TABLET ORAL at 01:59

## 2024-09-04 RX ADMIN — IBUPROFEN 800 MG: 400 TABLET, FILM COATED ORAL at 23:21

## 2024-09-04 RX ADMIN — CARBOPROST TROMETHAMINE 250 MCG: 250 INJECTION, SOLUTION INTRAMUSCULAR at 02:02

## 2024-09-04 RX ADMIN — TRANEXAMIC ACID 1 G: 10 INJECTION, SOLUTION INTRAVENOUS at 01:58

## 2024-09-04 RX ADMIN — ACETAMINOPHEN 650 MG: 325 TABLET ORAL at 07:05

## 2024-09-04 RX ADMIN — METHYLERGONOVINE 200 MCG: 0.2 TABLET ORAL at 08:22

## 2024-09-04 RX ADMIN — FENTANYL CITRATE 100 MCG: 50 INJECTION, SOLUTION INTRAMUSCULAR; INTRAVENOUS at 01:45

## 2024-09-04 RX ADMIN — Medication 45 MG: at 08:22

## 2024-09-04 RX ADMIN — CEFAZOLIN SODIUM 1 G: 1 INJECTION, POWDER, FOR SOLUTION INTRAMUSCULAR; INTRAVENOUS at 03:38

## 2024-09-04 RX ADMIN — PRENATAL VITAMINS-IRON FUMARATE 27 MG IRON-FOLIC ACID 0.8 MG TABLET 1 TABLET: at 08:22

## 2024-09-04 RX ADMIN — Medication 100 ML/HR: at 03:39

## 2024-09-04 RX ADMIN — DOCUSATE SODIUM 100 MG: 100 CAPSULE, LIQUID FILLED ORAL at 08:22

## 2024-09-04 RX ADMIN — ACETAMINOPHEN 650 MG: 325 TABLET ORAL at 23:21

## 2024-09-04 RX ADMIN — ONDANSETRON 4 MG: 2 INJECTION INTRAMUSCULAR; INTRAVENOUS at 02:47

## 2024-09-04 RX ADMIN — ACETAMINOPHEN 650 MG: 325 TABLET ORAL at 17:41

## 2024-09-04 RX ADMIN — CEFAZOLIN 1 G: 1 INJECTION, POWDER, FOR SOLUTION INTRAMUSCULAR; INTRAVENOUS at 03:38

## 2024-09-04 RX ADMIN — ACETAMINOPHEN 650 MG: 325 TABLET ORAL at 11:33

## 2024-09-04 RX ADMIN — IBUPROFEN 800 MG: 400 TABLET, FILM COATED ORAL at 07:04

## 2024-09-04 RX ADMIN — METHYLERGONOVINE MALEATE 200 MCG: 0.2 INJECTION INTRAVENOUS at 01:58

## 2024-09-04 RX ADMIN — IBUPROFEN 800 MG: 400 TABLET, FILM COATED ORAL at 15:37

## 2024-09-04 RX ADMIN — METHYLERGONOVINE 200 MCG: 0.2 TABLET ORAL at 14:43

## 2024-09-04 ASSESSMENT — ACTIVITIES OF DAILY LIVING (ADL)
ADLS_ACUITY_SCORE: 18
ADLS_ACUITY_SCORE: 19
ADLS_ACUITY_SCORE: 18
ADLS_ACUITY_SCORE: 19
ADLS_ACUITY_SCORE: 18
ADLS_ACUITY_SCORE: 19
ADLS_ACUITY_SCORE: 18
ADLS_ACUITY_SCORE: 19
ADLS_ACUITY_SCORE: 19
ADLS_ACUITY_SCORE: 18
ADLS_ACUITY_SCORE: 19
ADLS_ACUITY_SCORE: 18
ADLS_ACUITY_SCORE: 19
ADLS_ACUITY_SCORE: 18
ADLS_ACUITY_SCORE: 18

## 2024-09-04 NOTE — PROGRESS NOTES
OB Progress Note  9/3/2024  8:59 PM    S:  Very comfortable with epidural      O:  /60   Pulse 104   Temp 97.8  F (36.6  C) (Temporal)   Resp 18   EFM: baseline 135, accelerations present, absent decelerations, mod variability; Category I  [place]:  Ctx q2-4 min  SVE:  6/80-90%/-1  Membranes: AROM, clear fluid  Pitocin 20mu/min    A/P:  29 year old  @41w4d, active labor, Cat I, epidural, Pitocin, AROM x 4 hours, afebrile  1.  Routine care  2.  Continue Pitocin per protocol  3.  Anticipate     Kateryna Saldaña CNM

## 2024-09-04 NOTE — ANESTHESIA PREPROCEDURE EVALUATION
"Anesthesia Pre-Procedure Evaluation    Patient: Jemima Saldana   MRN: [id number] : 1995        Procedure : * No procedures listed *          History reviewed. No pertinent past medical history.   History reviewed. No pertinent surgical history.   No Known Allergies   Social History     Tobacco Use    Smoking status: Never    Smokeless tobacco: Never   Substance Use Topics    Alcohol use: Not Currently      Wt Readings from Last 1 Encounters:   22 72.1 kg (159 lb)        Anesthesia Evaluation   Pt has had prior anesthetic.     No history of anesthetic complications       ROS/MED HX  ENT/Pulmonary:       Neurologic:       Cardiovascular:       METS/Exercise Tolerance:     Hematologic:       Musculoskeletal:       GI/Hepatic:       Renal/Genitourinary:       Endo:       Psychiatric/Substance Use:       Infectious Disease:       Malignancy:       Other:            Physical Exam    Airway        Mallampati: II   TM distance: > 3 FB   Neck ROM: full   Mouth opening: > 3 cm    Respiratory Devices and Support         Dental       (+) Minor Abnormalities - some fillings, tiny chips      Cardiovascular   cardiovascular exam normal          Pulmonary   pulmonary exam normal                OUTSIDE LABS:  CBC:   Lab Results   Component Value Date    WBC 10.0 2024    WBC 7.5 2024    HGB 12.1 2024    HGB 10.6 (L) 2024    HCT 33.8 (L) 2024    HCT 35.8 2024     2024     2024     BMP: No results found for: \"NA\", \"POTASSIUM\", \"CHLORIDE\", \"CO2\", \"BUN\", \"CR\", \"GLC\"  COAGS: No results found for: \"PTT\", \"INR\", \"FIBR\"  POC: No results found for: \"BGM\", \"HCG\", \"HCGS\"  HEPATIC: No results found for: \"ALBUMIN\", \"PROTTOTAL\", \"ALT\", \"AST\", \"GGT\", \"ALKPHOS\", \"BILITOTAL\", \"BILIDIRECT\", \"CLARA\"  OTHER:   Lab Results   Component Value Date    A1C 5.3 2024       Anesthesia Plan    ASA Status:  2       Anesthesia Type: Epidural.              Consents    Anesthesia " Plan(s) and associated risks, benefits, and realistic alternatives discussed. Questions answered and patient/representative(s) expressed understanding.     - Discussed:     - Discussed with:  Patient            Postoperative Care            Comments:    Other Comments: Healthy multip.  NKDA           Chau Oconnor MD    I have reviewed the pertinent notes and labs in the chart from the past 30 days and (re)examined the patient.  Any updates or changes from those notes are reflected in this note.

## 2024-09-04 NOTE — L&D DELIVERY NOTE
OB Delivery Summary    1.  with  IUP @ 41w5d  2.  Pregnancy complications- arcuate uterus, normal growth  3.  Labor- Induced with Pitocin and augmented with AROM  4   Analgesia- Labor Epidural  5.  Fetal heart tones-145 baseline, accelerations present, mod variability, early decelerations  6.  Labor interventions- none  7.  Membranes-AROM @ 1705 on 9/3/2024  8.   Infant- viable, vigorous male, vertex, MICKI with right hand presentation,  delivered at 0055 on 2024, Apgars 8 and 9 at one and five minutes.  Weight 7 pounds 13oz  9.   Placenta- manual removal, see Dr Garza's note  10.  Lacerations- very superficial vaginal laceration and superficial bilateral labial lacerations, hemostatic, not repaired  11.  Complications- Compound presentation, post partum hemorrhage, see Dr Garza's note  12.  EBL- 1740cc  13.  Labor course:  1st stage 6 hours                                  2nd stage 1hr 55min                                   3rd stage 1hr 5min    Kateryna Saldaña CNM  2024  2:41 AM

## 2024-09-04 NOTE — ANESTHESIA PROCEDURE NOTES
Epidural catheter Procedure Note    Pre-Procedure   Staff -        Anesthesiologist:  Chau Oconnor MD       Performed By: anesthesiologist       Location: OB       Pre-Anesthestic Checklist: patient identified, IV checked, risks and benefits discussed, informed consent, pre-op evaluation and at physician/surgeon's request  Timeout:       Correct Patient: Yes        Correct Procedure: Yes        Correct Position: Yes   Procedure Documentation  Procedure: epidural catheter       Patient Position: sitting       Patient Prep/Sterile Barriers: sterile gloves, mask, patient draped, 3 applications of betadine, and waited for it to completely dry       Skin prep: Betadine       Local skin infiltrated with 3 mL of 1% lidocaine.        Insertion Site: L3-4. (midline approach).       Technique: LORT air        AISHA at 3.5 cm.       Needle Type: ToMyCadboxy needle       Needle Gauge: 17.        Needle Length (Inches): 3.5        Catheter: 19 G.          Catheter threaded easily.         3.5 cm epidural space.           # of attempts: 1 and  # of redirects:     Assessment/Narrative         Paresthesias: No.       Test dose of mL at.         Test dose negative, 3 minutes after injection, for signs of intravascular, subdural, or intrathecal injection.       Insertion/Infusion Method: LORT air       Aspiration negative for Heme or CSF via Epidural Catheter.    Medication(s) Administered   0.125% Bupivacaine + 2 mcg/mL Fentanyl via CADD (Epidural) - EPIDURAL   8 mL - 9/3/2024 8:41:00 PM  2% Lidocaine w/ 1:200K Epi (EPIDURAL) - EPIDURAL   5 mL - 9/3/2024 8:41:00 PM   Comments:  Test dose of 3cc negative, then additional 2cc of test dose given.    Adhesive spray, tegaderm, and tape to secure  Epidural bolused with pump mix    Orders to manage the epidural infusion have been entered and, through coordination with the nurse, we will continue to manage and monitor the patient's labor epidural.  We will continuously be available to  "adjust as needed throughout the entire labor and delivery process.        FOR [facility] ONLY:   Pain Team Contact information: please page the Pain Team Via [facility]. Search \"Pain\". During daytime hours, please page the attending first. At night please page the resident first.      "

## 2024-09-04 NOTE — CODE/RAPID RESPONSE
[facility]  House [facility] RRT Note  9/4/2024   Time called: 0209  RRT called for: Postpartum hemorrhage  Code status: Full Code    Assessment & Plan   I was paged to the bedside to evaluate Ms. Jemima Saldana for an acute blood loss anemia 2/2 postpartum hemorrhage.    Upon my arrival the patient was in the delivery bed, OB MD evaluating patient. BP soft 83/48 mmHg, -120. Patient was pale, diaphoretic, but A/Ox4. Radial pulse thready. Patient denied chest pain, dyspnea but did endorse dizziness and light-headedness. See OB documentation for EBL. Patient was on pitocin and crystalloid bolus had been started. Labs were being sent when RRT arrived and 2 units PRBC had been ordered. Rapid transfuser was utilized for blood products and following transfusion patient's symptoms had improved with decreased dizziness and light-headedness as well as improved vitals 130/70  SpO2 98% on RA, no accessory muscle use.    Pre-transfusion Hgb returned 11.2, equated slightly lower in the setting of recent delivery and 1800 ml EBL. Recheck pending per OB MD.    Diagnosis:  -- Acute blood loss anemia; symptomatic  -- Vaginal delivery with postpartum hemorrhage  1800 ml EBL beginning after placenta delivery. Patient was symptomatic with pallor, dizziness, light-headedness, and hypotension. Improved after crystalloid and PRBC transfusions.    Plan:  -- Recheck CBC per primary team  -- Please reach out for any needs from the hospitalist service    At the conclusion of this RRT patient was hemodynamically stable and will remain on current unit    Thank you for the opportunity to participate in the care of this patient.    Her history is significant for:  History reviewed. No pertinent past medical history.  History reviewed. No pertinent surgical history.    Review of Systems   The 10 point Review of Systems is negative other than noted in the HPI or here.     Allergies   No Known Allergies    Physical  Exam   Physical Exam  Eyes:      Pupils: Pupils are equal, round, and reactive to light.   Cardiovascular:      Rate and Rhythm: Regular rhythm. Tachycardia present.   Pulmonary:      Effort: Pulmonary effort is normal.   Skin:     General: Skin is warm and dry.      Capillary Refill: Capillary refill takes less than 2 seconds.      Coloration: Skin is pale.   Neurological:      General: No focal deficit present.      Mental Status: She is alert and oriented to person, place, and time.         Vital Signs with Ranges:  Temp:  [97  F (36.1  C)-98.6  F (37  C)] 97  F (36.1  C)  Pulse:  [104] 104  Resp:  [16-18] 18  BP: ()/(48-68) 92/54  SpO2:  [94 %-98 %] 96 %  No intake/output data recorded.    Data   Results for orders placed or performed during the hospital encounter of 09/03/24 (from the past 24 hour(s))   ABO/Rh type and screen *Canceled*    Narrative    The following orders were created for panel order ABO/Rh type and screen.  Procedure                               Abnormality         Status                     ---------                               -----------         ------                       Please view results for these tests on the individual orders.   CBC with Platelets & Differential *Canceled*    Narrative    The following orders were created for panel order CBC with Platelets & Differential.  Procedure                               Abnormality         Status                     ---------                               -----------         ------                       Please view results for these tests on the individual orders.   Hemoglobin   Result Value Ref Range    Hemoglobin 12.1 11.7 - 15.7 g/dL   Treponema Abs w Reflex to RPR and Titer   Result Value Ref Range    Treponema Antibody Total Nonreactive Nonreactive   ABO/Rh type and screen    Narrative    The following orders were created for panel order ABO/Rh type and screen.  Procedure                               Abnormality          Status                     ---------                               -----------         ------                     Adult Type and Screen[[id number]]                            Final result                 Please view results for these tests on the individual orders.   Adult Type and Screen   Result Value Ref Range    ABO/RH(D) A POS     Antibody Screen Negative Negative    SPECIMEN EXPIRATION DATE 20240906235900    Prepare red blood cells (unit)   Result Value Ref Range    ISSUE DATE AND TIME 20240904021200     Blood Component Type Red Blood Cells     Product Code X1569C24     Unit Status Issued     Unit Number [id number]     UNIT ABO/RH A+     CODING SYSTEM [id number]     UNIT TYPE ISBT 6200    Prepare red blood cells (unit)   Result Value Ref Range    ISSUE DATE AND TIME 20240904021200     Blood Component Type Red Blood Cells     Product Code M8889L64     Unit Status Transfused     Unit Number [id number]     UNIT ABO/RH A+     CODING SYSTEM [id number]     UNIT TYPE ISBT 6200    CBC with Platelets & Differential    Narrative    The following orders were created for panel order CBC with Platelets & Differential.  Procedure                               Abnormality         Status                     ---------                               -----------         ------                     CBC with platelets and d...[[id number]]  Abnormal            Final result                 Please view results for these tests on the individual orders.   INR   Result Value Ref Range    INR 1.00 0.85 - 1.15   Partial thromboplastin time   Result Value Ref Range    aPTT 28 22 - 38 Seconds   Fibrinogen activity   Result Value Ref Range    Fibrinogen Activity 274 170 - 510 mg/dL   CBC with platelets and differential   Result Value Ref Range    WBC Count 15.8 (H) 4.0 - 11.0 10e3/uL    RBC Count 3.87 3.80 - 5.20 10e6/uL    Hemoglobin 11.2 (L) 11.7 - 15.7 g/dL    Hematocrit 33.8 (L) 35.0 - 47.0 %    MCV 87 78 - 100 fL    MCH 28.9 26.5 - 33.0 pg     MCHC 33.1 31.5 - 36.5 g/dL    RDW 14.3 10.0 - 15.0 %    Platelet Count 204 150 - 450 10e3/uL    % Neutrophils 79 %    % Lymphocytes 14 %    % Monocytes 5 %    % Eosinophils 0 %    % Basophils 0 %    % Immature Granulocytes 1 %    NRBCs per 100 WBC 0 <1 /100    Absolute Neutrophils 12.5 (H) 1.6 - 8.3 10e3/uL    Absolute Lymphocytes 2.2 0.8 - 5.3 10e3/uL    Absolute Monocytes 0.8 0.0 - 1.3 10e3/uL    Absolute Eosinophils 0.0 0.0 - 0.7 10e3/uL    Absolute Basophils 0.1 0.0 - 0.2 10e3/uL    Absolute Immature Granulocytes 0.2 <=0.4 10e3/uL    Absolute NRBCs 0.0 10e3/uL   Prepare red blood cells (unit)   Result Value Ref Range    Blood Component Type Red Blood Cells     Product Code S4914J83     Unit Status Issued     Unit Number [id number]     CROSSMATCH Compatible     CODING SYSTEM [id number]     ISSUE DATE AND TIME 20240904022200     UNIT ABO/RH A+     UNIT TYPE ISBT 6200    Prepare red blood cells (unit)   Result Value Ref Range    Blood Component Type Red Blood Cells     Product Code R4151J72     Unit Status Issued     Unit Number [id number]     CROSSMATCH Compatible     CODING SYSTEM [id number]     ISSUE DATE AND TIME 20240904022200     UNIT ABO/RH A+     UNIT TYPE ISBT 6200    Prepare red blood cells (unit)   Result Value Ref Range    Blood Component Type Red Blood Cells     Product Code Q8585J86     Unit Status Issued     Unit Number [id number]     CROSSMATCH Compatible     CODING SYSTEM [id number]     ISSUE DATE AND TIME 20240904022200     UNIT ABO/RH A+     UNIT TYPE ISBT 6200    Prepare red blood cells (unit)   Result Value Ref Range    Blood Component Type Red Blood Cells     Product Code Z5878T00     Unit Status Issued     Unit Number [id number]     CROSSMATCH Compatible     CODING SYSTEM [id number]     ISSUE DATE AND TIME 20240904022200     UNIT ABO/RH A+     UNIT TYPE ISBT 6200    Prepare plasma (unit)   Result Value Ref Range    ISSUE DATE AND TIME 20240904022300     Blood Component Type Plasma      Product Code D1002K81     Unit Status Issued     Unit Number [id number]     UNIT ABO/RH A+     CODING SYSTEM JSBZ043     UNIT TYPE ISBT 6200    Prepare plasma (unit)   Result Value Ref Range    ISSUE DATE AND TIME [id number]     Blood Component Type Plasma     Product Code O7466P78     Unit Status Issued     Unit Number [id number]     UNIT ABO/RH A+     CODING SYSTEM NFOT997     UNIT TYPE ISBT 6200    Prepare pheresed platelets (unit)   Result Value Ref Range    ISSUE DATE AND TIME 92622881483490     Blood Component Type Platelets     Product Code A8653E37     Unit Status Issued     Unit Number [id number]     UNIT ABO/RH A+     CODING SYSTEM SWEB285     UNIT TYPE ISBT 6200    Prepare red blood cells (unit)   Result Value Ref Range    ISSUE DATE AND TIME 20240904023300     Blood Component Type Red Blood Cells     Product Code X3451S79     Unit Status Issued     Unit Number [id number]     UNIT ABO/RH A+     CODING SYSTEM TXYZ787     UNIT TYPE ISBT 6200    Prepare red blood cells (unit)   Result Value Ref Range    ISSUE DATE AND TIME [id number]     Blood Component Type Red Blood Cells     Product Code X6445W01     Unit Status Issued     Unit Number [id number]     UNIT ABO/RH A+     CODING SYSTEM JACF298     UNIT TYPE ISBT 6200    Prepare red blood cells (unit)   Result Value Ref Range    ISSUE DATE AND TIME [id number]     Blood Component Type Red Blood Cells     Product Code P8205Q50     Unit Status Issued     Unit Number [id number]     UNIT ABO/RH A+     CODING SYSTEM AGHQ602     UNIT TYPE ISBT 6200    Prepare red blood cells (unit)   Result Value Ref Range    ISSUE DATE AND TIME [id number]     Blood Component Type Red Blood Cells     Product Code A3734I66     Unit Status Issued     Unit Number [id number]     UNIT ABO/RH A+     CODING SYSTEM IUVD221     UNIT TYPE ISBT 6200    Prepare cryoprecipitate (single unit)   Result Value Ref Range    ISSUE DATE AND TIME [id number]      Blood Component Type Cryoprecipitate     Product Code Y7928X29     Unit Status Issued     Unit Number [id number]     UNIT ABO/RH A+     CODING SYSTEM QRDE678     UNIT TYPE ISBT 6200      COVID-19 PCR Results           No data to display              COVID-19 Antibody Results, Testing for Immunity           No data to display                Time Spent on this Encounter   I spent 45 minutes on the unit/floor managing the care of Jemima Saldana. Over 50% of my time was spent counseling the patient and/or coordinating care regarding services listed in this note.    CEE Barnes, CNP  Hospitalist - House LUC[facility]  Text me on the [employer] lucia for a textback

## 2024-09-04 NOTE — PROGRESS NOTES
Post-partum Note      S: Patient is doing ok- tired but denies pain. No lightheadedness. Dottie and story in place. Tolerating regular diet without nausea or vomiting.  Bleeding now minimal.      O:   Patient Vitals for the past 24 hrs:   BP Temp Temp src Pulse Resp SpO2   09/04/24 0830 107/55 -- -- -- -- 97 %   09/04/24 0815 112/51 -- -- -- -- 97 %   09/04/24 0800 108/57 -- -- -- -- 97 %   09/04/24 0745 104/57 -- -- -- -- 97 %   09/04/24 0730 109/58 -- -- -- -- 96 %   09/04/24 0715 106/57 -- -- -- -- 98 %   09/04/24 0705 111/59 -- -- -- -- --   09/04/24 0700 101/56 98.1  F (36.7  C) -- -- -- --   09/04/24 0645 (!) 85/54 -- -- -- -- --   09/04/24 0630 108/59 -- -- -- -- --   09/04/24 0615 110/56 -- -- -- -- --   09/04/24 0605 113/56 -- -- -- -- --   09/04/24 0600 97/56 -- -- -- -- --   09/04/24 0545 101/56 -- -- -- -- --   09/04/24 0530 107/56 -- -- -- -- --   09/04/24 0515 104/56 -- -- -- -- --   09/04/24 0500 110/48 -- -- -- -- --   09/04/24 0445 114/55 -- -- -- -- --   09/04/24 0430 106/58 97  F (36.1  C) -- -- -- --   09/04/24 0415 116/59 -- -- -- -- --   09/04/24 0400 112/57 -- -- -- -- --   09/04/24 0345 116/57 -- -- -- -- --   09/04/24 0339 114/56 -- -- -- -- --   09/04/24 0330 119/55 -- -- -- -- --   09/04/24 0315 114/66 -- -- -- -- --   09/04/24 0305 -- -- -- -- -- 97 %   09/04/24 0300 -- -- -- -- -- 97 %   09/04/24 0257 127/71 -- -- -- -- --   09/04/24 0255 -- -- -- -- -- 96 %   09/04/24 0252 126/67 -- -- -- -- --   09/04/24 0250 -- -- -- -- -- 97 %   09/04/24 0247 135/60 -- -- -- -- --   09/04/24 0245 -- -- -- -- -- 98 %   09/04/24 0242 (!) 156/65 -- -- -- -- --   09/04/24 0240 -- -- -- -- -- 99 %   09/04/24 0238 (!) 151/66 -- -- -- -- --   09/04/24 0235 -- -- -- -- -- 99 %   09/04/24 0233 (!) 147/81 -- -- -- -- --   09/04/24 0230 -- -- -- -- -- 97 %   09/04/24 0227 100/72 -- -- -- -- --   09/04/24 0225 -- -- -- -- -- 97 %   09/04/24 0222 98/58 -- -- -- -- --   09/04/24 0221 -- 97  F (36.1  C) -- -- -- --    24 -- 97  F (36.1  C) -- -- -- 97 %   24 95/54 -- -- -- -- --   245 92/54 -- -- -- -- 96 %   24 -- -- -- -- -- 97 %   24 (!) 66/34 -- -- -- -- --   24 -- -- -- -- -- 98 %   24 (!) 83/48 97  F (36.1  C) Temporal 120 16 96 %   24 -- -- -- -- -- 96 %   24 -- -- -- -- -- 98 %   24 0145 126/68 -- -- -- -- 98 %   24 0140 -- -- -- -- -- 96 %   24 013 -- -- -- -- -- 97 %   24 0130 131/60 -- -- -- -- 96 %   24 0125 -- -- -- -- -- 96 %   24 0120 -- -- -- -- -- 98 %   24 0115 122/62 -- -- -- -- 98 %   24 0103 -- 97.5  F (36.4  C) -- -- -- --   24 0100 119/58 -- -- -- -- 95 %   24 0045 114/63 -- -- -- -- 96 %   24 0030 113/62 -- -- -- -- 96 %   24 0015 111/54 -- -- -- -- 96 %   24 0000 124/60 -- -- -- -- 94 %   24 2345 124/57 -- -- -- -- 97 %   24 2331 -- 97  F (36.1  C) -- -- -- --   24 2105 138/57 98  F (36.7  C) Temporal -- -- 97 %   24 -- 97.8  F (36.6  C) Temporal 104 18 --   24 1853 -- 98.1  F (36.7  C) Temporal -- 16 --   24 1713 116/60 -- -- -- 16 --   24 1709 -- 98.6  F (37  C) Temporal -- -- --   24 1348 101/52 98.1  F (36.7  C) (P) Temporal -- (P) 16 --   24 1038 111/55 -- -- -- 16 --     Gen:  Resting comfortably, NAD  Pulm:  Breathing comfortably on room air  Abd:  Soft, appropriately ttp, non-distended.Fundus at umbilicus, firm and non-tender, mihir in place  :  No active bleeding  Ext:  non-tender, no bilateral LE edema    Mihir to suction. 400ml in cannister. No active bleeding.    I/O last 3 completed shifts:  In: -   Out: 3820 [Urine:1700; Blood:2120]    Hgb:   Hemoglobin   Date Value Ref Range Status   2024 11.5 (L) 11.7 - 15.7 g/dL Final     - Plts 204->144  - INR 1.0  - PTT 28  - Fibrinogen 274    Assessment/Plan:  29 year old  on PPD #0 s/p  complicated by  retained placenta and massive postpartum hemorrhage, hypotension, rapid response. Total QBL 2120 ml. Currently stable, Dottie in place to suction (placed at approximately 2am).   1. Postpartum hemorrhage secondary to retained placenta, atony s/p placenta removal.  - S/p IM methergine x1, IM hemabate x1, 800 mcg [place] cytotec, IV TXA. Placement of Dottie. PO methergine ordered x2 doses.  - Hgb 13.7->11.5->2u PRBC (massive transfusion protocol activated, now discontinued)  - Repeat labs at 10am  - Keep Dottie to suction for now, will turn off after 10am labs resulted  2. Routine postpartum cares  3. Rh: Positive  Dispo: DC home pending postpartum recovery from massive PPH.     Lucie Rosado MD  [facility]  9/4/2024, 8:46 AM

## 2024-09-04 NOTE — CARE PLAN
Called MD to update on blood loss during the post partum recovery period.   Provider present at bedside.  Pt reports feeling well and denies pain.   AM stat labs drawn and sent.

## 2024-09-04 NOTE — PROGRESS NOTES
Brief Progress Note    Notified of RN that in the last 3 hours has had an additional ~300 (100 mL on pad and 200mL additional in cannister)      Pt is feeling well. Denies any lightheadedness or dizziness.    Vitals:    09/04/24 0330 09/04/24 0339 09/04/24 0345 09/04/24 0400   BP: 119/55 114/56 116/57 112/57   Pulse:       Resp:       Temp:       TempSrc:       SpO2:         Bernard is draining clear yellow urine  No blood on pad (changed 1 hour ago)  Cannister with total 300 mL (clotted material), rare drips noted from tubing.     PPH: Total QBL now 2120 mL  - AM Labs collected now (CBC and coags)  - Recommend keeping Dottie to suction for now, awaiting AM lab results.     Will sign out patient to oncoming provider at 0800.    Teresa Garza MD

## 2024-09-04 NOTE — PLAN OF CARE
Pt. arrived on floor at 1400, oriented to room and safety protocols. VSS, fundus firm, light flow. No clots. IP and Tucks in place. Bernard draining clear, yellow urine. Emmy care done, gown changed. Oral Methergine given as ordered. Breastfeeding her baby boy ind.

## 2024-09-04 NOTE — L&D DELIVERY NOTE
Manual removal of placenta and postpartum hemorrhage management.    Called by CNM after delivery of infant (see her delivery note for details) due to concern for retained placenta. Had been waiting 20 minutes after delivery when notified. Instructed to start pitocin.  When I arrived at bedside placenta still in place, minimal to no bleeding. 100 mcg of fentanyl given, epidural still running. Manual removal of placenta performed. After removal of placenta, final sweep performed and no retained products identified. Noted brisk uterine bleeding at this time due to uterine atony. Hemorrhage kit called for. Bladder was drained. Bimanual massage performed.  IM methergine, IM hemabate, IV TXA and [place] cytotec placed. After administration of uterotonics noted QBL ~1000. PT appearing pale and reporting lightheaded but responding to commands, denying pain. BPs 66/30s, pulse 90s. Rapid response called. IVF Bolus running. STAT labs collected, second IV and Massive transfusion protocol called. BPs improving. Pt then received 2U pRBCs via rapid transfuser, while infusing Hgb returned 11.2. Bernard and Dottie device placed. 120 mL in cervical balloon of Dottie, connected to suction. No bleeding noted around Dottie - slow fill of tube to cannister.  BPs stable, mildly elevated. Rapid response team left room given stability. Pt feeling less lightheaded.     Summary:  - Uterotonics: IM methergine x1, IM hemabate x1, 800 mcg [place] cytotec, IV TXA  - MTP with 2 U PRBCs given  - Dottie placed with 120 mL in cervical balloon, attached to suction ~100 in tubing <100 ML in cannister at 1 hr. Continue to suction   - QBL 1300mL in bag, 414mL in laps, 100mL in tubing = 1814 mL    Repeat CBC now. AM CBC and coags ordered. Second bag of pitocin to run. Dose of ancef for ppx after manual sweep ordered.    To stay on L&D until morning rounds    Teresa Garza MD

## 2024-09-04 NOTE — PROVIDER NOTIFICATION
09/04/24 1203   Provider Notification   Provider Name/Title Dr. Rosado   Method of Notification In Department     Orders received from Dr. Rosado to discontiue Dottie one hour after deflating Dottie and turing off suction if no increased bleeding. Okay to transfer to PP unit after Dottie is out.

## 2024-09-05 LAB
APTT PPP: 34 SECONDS (ref 22–38)
ERYTHROCYTE [DISTWIDTH] IN BLOOD BY AUTOMATED COUNT: 14.5 % (ref 10–15)
FIBRINOGEN PPP-MCNC: 418 MG/DL (ref 170–510)
HCT VFR BLD AUTO: 29.8 % (ref 35–47)
HGB BLD-MCNC: 9.1 G/DL (ref 11.7–15.7)
HGB BLD-MCNC: 9.9 G/DL (ref 11.7–15.7)
INR PPP: 0.98 (ref 0.85–1.15)
MCH RBC QN AUTO: 29 PG (ref 26.5–33)
MCHC RBC AUTO-ENTMCNC: 33.2 G/DL (ref 31.5–36.5)
MCV RBC AUTO: 87 FL (ref 78–100)
PATH REPORT.COMMENTS IMP SPEC: NORMAL
PATH REPORT.COMMENTS IMP SPEC: NORMAL
PATH REPORT.FINAL DX SPEC: NORMAL
PATH REPORT.GROSS SPEC: NORMAL
PATH REPORT.MICROSCOPIC SPEC OTHER STN: NORMAL
PATH REPORT.RELEVANT HX SPEC: NORMAL
PHOTO IMAGE: NORMAL
PLATELET # BLD AUTO: 126 10E3/UL (ref 150–450)
RBC # BLD AUTO: 3.41 10E6/UL (ref 3.8–5.2)
WBC # BLD AUTO: 13 10E3/UL (ref 4–11)

## 2024-09-05 PROCEDURE — 85384 FIBRINOGEN ACTIVITY: CPT | Performed by: OBSTETRICS & GYNECOLOGY

## 2024-09-05 PROCEDURE — 85018 HEMOGLOBIN: CPT | Performed by: ADVANCED PRACTICE MIDWIFE

## 2024-09-05 PROCEDURE — 85027 COMPLETE CBC AUTOMATED: CPT | Performed by: OBSTETRICS & GYNECOLOGY

## 2024-09-05 PROCEDURE — 85610 PROTHROMBIN TIME: CPT | Performed by: OBSTETRICS & GYNECOLOGY

## 2024-09-05 PROCEDURE — 36415 COLL VENOUS BLD VENIPUNCTURE: CPT | Performed by: OBSTETRICS & GYNECOLOGY

## 2024-09-05 PROCEDURE — 250N000013 HC RX MED GY IP 250 OP 250 PS 637: Performed by: OBSTETRICS & GYNECOLOGY

## 2024-09-05 PROCEDURE — 120N000013 HC R&B IMCU

## 2024-09-05 PROCEDURE — 85730 THROMBOPLASTIN TIME PARTIAL: CPT | Performed by: OBSTETRICS & GYNECOLOGY

## 2024-09-05 PROCEDURE — 88307 TISSUE EXAM BY PATHOLOGIST: CPT | Mod: 26 | Performed by: STUDENT IN AN ORGANIZED HEALTH CARE EDUCATION/TRAINING PROGRAM

## 2024-09-05 PROCEDURE — 36415 COLL VENOUS BLD VENIPUNCTURE: CPT | Performed by: ADVANCED PRACTICE MIDWIFE

## 2024-09-05 RX ADMIN — IBUPROFEN 800 MG: 400 TABLET, FILM COATED ORAL at 13:12

## 2024-09-05 RX ADMIN — DOCUSATE SODIUM 100 MG: 100 CAPSULE, LIQUID FILLED ORAL at 10:02

## 2024-09-05 RX ADMIN — IBUPROFEN 800 MG: 400 TABLET, FILM COATED ORAL at 20:22

## 2024-09-05 RX ADMIN — PRENATAL VITAMINS-IRON FUMARATE 27 MG IRON-FOLIC ACID 0.8 MG TABLET 1 TABLET: at 10:03

## 2024-09-05 RX ADMIN — IBUPROFEN 800 MG: 400 TABLET, FILM COATED ORAL at 06:22

## 2024-09-05 RX ADMIN — ACETAMINOPHEN 650 MG: 325 TABLET ORAL at 13:11

## 2024-09-05 RX ADMIN — ACETAMINOPHEN 650 MG: 325 TABLET ORAL at 20:22

## 2024-09-05 RX ADMIN — Medication 45 MG: at 10:02

## 2024-09-05 RX ADMIN — ACETAMINOPHEN 650 MG: 325 TABLET ORAL at 06:22

## 2024-09-05 ASSESSMENT — ACTIVITIES OF DAILY LIVING (ADL)
ADLS_ACUITY_SCORE: 18
ADLS_ACUITY_SCORE: 19
ADLS_ACUITY_SCORE: 18
ADLS_ACUITY_SCORE: 19
ADLS_ACUITY_SCORE: 18
ADLS_ACUITY_SCORE: 19

## 2024-09-05 NOTE — ANESTHESIA POSTPROCEDURE EVALUATION
Patient: Jemima Saldana    Procedure: * No procedures listed *       Anesthesia Type:  Epidural    Note:  Disposition: Inpatient   Postop Pain Control: Uneventful            Sign Out: Well controlled pain   PONV:    Neuro/Psych:    Airway/Respiratory:    CV/Hemodynamics:    Other NRE:    DID A NON-ROUTINE EVENT OCCUR?            Last vitals:  Vitals:    09/04/24 1400 09/04/24 1741 09/04/24 2319   BP: 104/47 119/63 110/61   Pulse: 66 72 70   Resp: 18 16 16   Temp: 37.4  C (99.3  F) 37.1  C (98.7  F) 36.7  C (98.1  F)   SpO2:          Electronically Signed By: Eve Lehman  September 5, 2024  5:56 AM

## 2024-09-05 NOTE — PLAN OF CARE
VSS, fundus firm, light flow. Using IP and Tucks. Voiding without difficulty. Showered today. Hgb. 9.9. Started oral iron, will recheck Hgb. at 1700 and in AM. Saline lock in place. Breastfeeding her baby boy with a shield.

## 2024-09-05 NOTE — PLAN OF CARE
Goal Outcome Evaluation:      Plan of Care Reviewed With: patient, spouse    Overall Patient Progress: improvingOverall Patient Progress: improving     Pt states that pain is being well managed. Pt is up and moving independently. Fundus is firm with minimal lochia present. Vitally stable. Bernard removed at 0600 and due to void by 10 am. Patient encouraged to increase fluids and to notify nurse when ready to void. Bonding well with . Plan of care continues.

## 2024-09-05 NOTE — PROGRESS NOTES
Post-partum Note      S: Patient is doing well today.  Pain is controlled with PO medications.  Tolerating regular diet without nausea or vomiting.  Ambulating without dizziness.  Urinating without difficulty, story out this AM with good void already. Lochia normal, one clot yesterday when Dottie came out, no clots since.  Breastfeeding without support.    O:  Patient Vitals for the past 24 hrs:   BP Temp Temp src Pulse Resp SpO2   24 0718 127/70 97.7  F (36.5  C) Oral 68 18 --   24 2319 110/61 98.1  F (36.7  C) Oral 70 16 --   24 1741 119/63 98.7  F (37.1  C) Oral 72 16 --   24 1400 104/47 99.3  F (37.4  C) Oral 66 18 --   24 1255 95/50 -- -- -- -- 98 %   24 1245 111/58 -- -- -- -- 97 %   24 1240 -- -- -- -- -- 97 %   24 1235 -- -- -- -- -- 98 %   24 1230 -- -- -- -- -- 96 %   24 1225 -- -- -- -- -- 96 %   24 1223 104/56 -- -- -- -- --   24 1220 -- -- -- -- -- 98 %   24 1215 -- -- -- -- -- 98 %   24 1210 -- -- -- -- -- 98 %   24 1205 -- -- -- -- -- 98 %   24 1200 -- -- -- -- -- 98 %   24 1153 116/57 -- -- -- -- 98 %   24 1134 -- 97.7  F (36.5  C) -- -- -- --   24 1122 113/56 -- -- -- -- --   24 1000 103/51 -- -- -- -- 98 %   24 0930 114/52 -- -- -- -- 97 %   24 0900 105/53 -- -- -- -- 99 %       Gen:  Resting comfortably, NAD  Pulm:  Breathing comfortably on room air  Abd:  Soft, appropriately ttp, non-distended.Fundus at umbilicus, firm and non-tender.  Ext:  non-tender, trace bilateral LE edema    I/O last 3 completed shifts:  In: -   Out: 4300 [Urine:4300]    Hgb:     Hemoglobin   Date Value Ref Range Status   2024 9.9 (L) 11.7 - 15.7 g/dL Final   2024 10.6 (L) 11.7 - 15.7 g/dL Final       Assessment/Plan:  29 year old  on PPD #1 s/p , manual removal of placenta, PPH 2 liters, s/p 2 units PRBC.  1. Continue with routine postpartum management  2. EBL: 2120 ; pre  hemoglobin 12.1, AM hemogobin today 9.9, no symptoms of anemia. Acute blood loss anemia. On PO iron. Plan Hgb this evening, if continuing to drop plan IV iron.  4. A+, Rubella immune  5. Feed: Breastfeeding  6. DVT PPX: ambulate    Dispo: Anticipate DC home tomorrow. Warning signs reviewed. Follow-up in 2 and 6 weeks.    CEE Anthony CNM OB/GYN  9/5/2024, 8:49 AM

## 2024-09-06 VITALS
WEIGHT: 150.5 LBS | BODY MASS INDEX: 25.83 KG/M2 | SYSTOLIC BLOOD PRESSURE: 121 MMHG | DIASTOLIC BLOOD PRESSURE: 68 MMHG | RESPIRATION RATE: 16 BRPM | TEMPERATURE: 97.9 F | HEART RATE: 75 BPM | OXYGEN SATURATION: 98 %

## 2024-09-06 LAB — HGB BLD-MCNC: 11.2 G/DL (ref 11.7–15.7)

## 2024-09-06 PROCEDURE — 85018 HEMOGLOBIN: CPT | Performed by: ADVANCED PRACTICE MIDWIFE

## 2024-09-06 PROCEDURE — 250N000013 HC RX MED GY IP 250 OP 250 PS 637: Performed by: OBSTETRICS & GYNECOLOGY

## 2024-09-06 PROCEDURE — 36415 COLL VENOUS BLD VENIPUNCTURE: CPT | Performed by: ADVANCED PRACTICE MIDWIFE

## 2024-09-06 RX ORDER — IBUPROFEN 800 MG/1
800 TABLET, FILM COATED ORAL EVERY 6 HOURS PRN
COMMUNITY
Start: 2024-09-06

## 2024-09-06 RX ORDER — ACETAMINOPHEN 325 MG/1
650 TABLET ORAL EVERY 4 HOURS PRN
COMMUNITY
Start: 2024-09-06

## 2024-09-06 RX ORDER — DOCUSATE SODIUM 100 MG/1
100 CAPSULE, LIQUID FILLED ORAL DAILY
COMMUNITY
Start: 2024-09-07

## 2024-09-06 RX ADMIN — PRENATAL VITAMINS-IRON FUMARATE 27 MG IRON-FOLIC ACID 0.8 MG TABLET 1 TABLET: at 08:07

## 2024-09-06 RX ADMIN — IBUPROFEN 800 MG: 400 TABLET, FILM COATED ORAL at 04:40

## 2024-09-06 RX ADMIN — ACETAMINOPHEN 650 MG: 325 TABLET ORAL at 10:51

## 2024-09-06 RX ADMIN — Medication 45 MG: at 08:07

## 2024-09-06 RX ADMIN — DOCUSATE SODIUM 100 MG: 100 CAPSULE, LIQUID FILLED ORAL at 08:07

## 2024-09-06 RX ADMIN — IBUPROFEN 800 MG: 400 TABLET, FILM COATED ORAL at 10:51

## 2024-09-06 RX ADMIN — ACETAMINOPHEN 650 MG: 325 TABLET ORAL at 04:40

## 2024-09-06 ASSESSMENT — ACTIVITIES OF DAILY LIVING (ADL)
ADLS_ACUITY_SCORE: 18

## 2024-09-06 NOTE — PROGRESS NOTES
VSS, assessments WDL. Pt. received complete discharge paperwork  Pt. was given times of last dose for all discharge medications in writing on discharge medication sheets.  Discharge teaching included home medication, pain management, postpartum cares, and signs and symptoms of infection.  Discharge outcomes on care plan met.  Mother states understanding and comfort with self care and follow up care. Pt. to follow up with OB provider per discharge instructions.  Pt. had no further questions at the time of discharge and no unmet needs were identified.

## 2024-09-06 NOTE — LACTATION NOTE
"Lactation visit with Jemima, ALBERTINA, and baby boy.    Infant was just beginning a nursing session, Jemima states it has been feeling a little pinchy when infant has been suckling. We work on breast sandwiching to try to increase a deeper latch, infant is latching and unlatching. Jemima shares this pattern has been pretty normal too. LC assesses infant's mouth and observes infant to have a tight frenulum that is attached right at the end of infant's tongue. I have infant suck on my gloved finger and infant's suckle feels \"bitey\" Tongue extension observed to come just to [facility]. Discussed a nipple shield may help be beneficial to protect Jemima's nipples from becoming too sore while also ensuring infant is able to successfully transfer milk.     Provided 24 mm nipple shield, showed Jemima how to place it against her breast. It took infant a little bit to adjust to how the shield felt in his mouth but once he started suckling he developed into a nutritive rhythmic suckling pattern with swallow heard. He did not \"pop\" off the breast and Jemima shares this feels like a stronger suckle and it's more comfortable.    Recommended follow-up for further tongue assessment.     Discussed  breastfeeding basics:   1. Watch for early feeding cues (licking lips, stirring or rooting, sucking movement with mouth, hands to mouth).  2. Infant should breastfeed on demand and a minimum of 8 times in 24 hours. Encourage/offer to breastfeed infant at least 3 hours (from the start of the last feeding).     Reviewed breast feeding section in our \"Guide to Postpartum and  Care.\" Highlighting pages that educates to  feeding patterns/behavior: Day 1 infant may be more sleepy (the birthday nap); followed by cluster-feeding (breastfeeding marathon) on second day/night. We reviewed the feeding log in back of booklet, how/why tracking infant's feedings and wet/dirty diapers is important.    Recommended infant is offered both " "breasts with every feeding session.   Discussed physiology of milk production from colostrum through milk \"coming in\" between day 3-5 (typically); emphasizing adequate stimulation to the breast is what causes this change to occur. Answered questions regarding \"how to know when infant is done at the breast\". Discussed listening for infant to have audible swallows along with watching for changes in infant's stool color. Discussed normal infant weight loss and when infant should be back to birth weight.     Answered general pumping questions, finding correct flange size, etc. Offered ideas on how to \"build milk storage\", ie: pumping once infant is about one month of age (following first morning breast-feed). Educated on products to help with passive milk collection (ie: Haakaa) and when to offer a bottle. Jemima has a new breast pump for home use.       Follow up with Pediatrician as requested and encouraged lactation follow up. Reviewed [facility] outpatient lactation resources. Appreciative of visit.    Maribell Christina RN, IBCLC          "

## 2024-09-06 NOTE — PROGRESS NOTES
Post-partum Note      S: Patient is doing well today.  Pain is controlled with PO medications.  Tolerating regular diet without nausea or vomiting.  Ambulating without dizziness.  Urinating without difficulty. Lochia normal.  Breastfeeding.    O:  Patient Vitals for the past 24 hrs:   BP Temp Temp src Pulse Resp   24 0810 121/68 97.9  F (36.6  C) Oral 75 16   24 0055 115/66 98.6  F (37  C) Oral 74 16   24 1614 114/70 98  F (36.7  C) Oral 83 16       Gen:  Resting comfortably, NAD  Pulm:  Breathing comfortably on room air  Abd:  Soft, appropriately ttp, non-distended.Fundus at umbilicus, firm and non-tender.  Ext:  non-tender, trace bilateral LE edema    I/O last 3 completed shifts:  In: -   Out: 1050 [Urine:1050]    Hgb: 11.2    Hemoglobin   Date Value Ref Range Status   2024 11.2 (L) 11.7 - 15.7 g/dL Final   2024 9.1 (L) 11.7 - 15.7 g/dL Final       Assessment/Plan:  29 year old  on PPD #2 s/p  w/ PPH.  1. Continue with routine postpartum management  3. EBL: 2120 mL ; pre hemoglobin 11.2, dropped to 9.1,  now post hemogobin 11.2, no symptoms of anemia.  On PO iron.  4. Rubella immune  5. Feed: Breastfeeding - encouraged to work w/ outpatient lactation for tongue tie  6. PPH - plan 2 week check w/ hgb     Dispo: Anticipate DC home today. Warning signs reviewed. Follow-up in 2 & 6 weeks.    CEE Pradhan, [facility]  2024, 8:53 AM

## 2024-09-06 NOTE — PLAN OF CARE
Goal Outcome Evaluation:      Plan of Care Reviewed With: patient, spouse    Overall Patient Progress: improvingOverall Patient Progress: improving      Pt states that pain is being well managed. Pt is up and moving independently, voiding well. Vitally stable. Fundus is firm with minimal lochia present. Bonding well with . Plan of care continues.

## 2024-09-06 NOTE — DISCHARGE INSTRUCTIONS
Warning Signs after Having a Baby    Keep this paper on your fridge or somewhere else where you can see it.    Call your provider if you have any of these symptoms up to 12 weeks after having your baby.    Thoughts of hurting yourself or your baby  Pain in your chest or trouble breathing  Severe headache not helped by pain medicine  Eyesight concerns (blurry vision, seeing spots or flashes of light, other changes to eyesight)  Fainting, shaking or other signs of a seizure    Call 9-1-1 if you feel that it is an emergency.     The symptoms below can happen to anyone after giving birth. They can be very serious. Call your provider if you have any of these warning signs.    My provider s phone number: _______________________    Losing too much blood (hemorrhage)    Call your provider if you soak through a pad in less than an hour or pass blood clots bigger than a golf ball. These may be signs that you are bleeding too much.    Blood clots in the legs or lungs    After you give birth, your body naturally clots its blood to help prevent blood loss. Sometimes this increased clotting can happen in other areas of the body, like the legs or lungs. This can block your blood flow and be very dangerous.     Call your provider if you:  Have a red, swollen spot on the back of your leg that is warm or painful when you touch it.   Are coughing up blood.     Infection    Call your provider if you have any of these symptoms:  Fever of 100.4 F (38 C) or higher.  Pain or redness around your stitches if you had an incision.   Any yellow, white, or green fluid coming from places where you had stitches or surgery.    Mood Problems (postpartum depression)    Many people feel sad or have mood changes after having a baby. But for some people, these mood swings are worse.     Call your provider right away if you feel so anxious or nervous that you can't care for yourself or your baby.    Preeclampsia (high blood pressure)    Even if you  didn't have high blood pressure when you were pregnant, you are at risk for the high blood pressure disease called preeclampsia. This risk can last up to 12 weeks after giving birth.     Call your provider if you have:   Pain on your right side under your rib cage  Sudden swelling in the hands and face    Remember: You know your body. If something doesn't feel right, get medical help.     For informational purposes only. Not to replace the advice of your health care provider. Copyright 2020 [facility]. All rights reserved. Clinically reviewed by Shellie Wray, RNC-OB, MSN. [employer] [id number] - Rev 02/23.

## 2025-06-14 ENCOUNTER — HEALTH MAINTENANCE LETTER (OUTPATIENT)
Age: 30
End: 2025-06-14